# Patient Record
Sex: FEMALE | Race: WHITE
[De-identification: names, ages, dates, MRNs, and addresses within clinical notes are randomized per-mention and may not be internally consistent; named-entity substitution may affect disease eponyms.]

---

## 2018-01-29 ENCOUNTER — HOSPITAL ENCOUNTER (INPATIENT)
Dept: HOSPITAL 17 - PHED | Age: 83
LOS: 2 days | Discharge: HOME | DRG: 193 | End: 2018-01-31
Attending: HOSPITALIST | Admitting: HOSPITALIST
Payer: COMMERCIAL

## 2018-01-29 VITALS — HEART RATE: 107 BPM | DIASTOLIC BLOOD PRESSURE: 90 MMHG | SYSTOLIC BLOOD PRESSURE: 167 MMHG | OXYGEN SATURATION: 93 %

## 2018-01-29 VITALS
OXYGEN SATURATION: 93 % | SYSTOLIC BLOOD PRESSURE: 154 MMHG | DIASTOLIC BLOOD PRESSURE: 86 MMHG | RESPIRATION RATE: 18 BRPM | HEART RATE: 106 BPM

## 2018-01-29 VITALS — WEIGHT: 171.3 LBS | HEIGHT: 65 IN | BODY MASS INDEX: 28.54 KG/M2

## 2018-01-29 VITALS
OXYGEN SATURATION: 93 % | RESPIRATION RATE: 22 BRPM | DIASTOLIC BLOOD PRESSURE: 81 MMHG | SYSTOLIC BLOOD PRESSURE: 143 MMHG | HEART RATE: 122 BPM | TEMPERATURE: 97.6 F

## 2018-01-29 VITALS
HEART RATE: 114 BPM | OXYGEN SATURATION: 88 % | SYSTOLIC BLOOD PRESSURE: 194 MMHG | DIASTOLIC BLOOD PRESSURE: 84 MMHG | TEMPERATURE: 97.8 F | RESPIRATION RATE: 18 BRPM

## 2018-01-29 VITALS
DIASTOLIC BLOOD PRESSURE: 92 MMHG | SYSTOLIC BLOOD PRESSURE: 156 MMHG | RESPIRATION RATE: 20 BRPM | TEMPERATURE: 97.9 F | HEART RATE: 118 BPM | OXYGEN SATURATION: 90 %

## 2018-01-29 VITALS — OXYGEN SATURATION: 95 %

## 2018-01-29 VITALS
HEART RATE: 103 BPM | RESPIRATION RATE: 20 BRPM | SYSTOLIC BLOOD PRESSURE: 169 MMHG | DIASTOLIC BLOOD PRESSURE: 99 MMHG | OXYGEN SATURATION: 94 %

## 2018-01-29 VITALS
SYSTOLIC BLOOD PRESSURE: 157 MMHG | RESPIRATION RATE: 20 BRPM | DIASTOLIC BLOOD PRESSURE: 74 MMHG | OXYGEN SATURATION: 94 % | HEART RATE: 118 BPM

## 2018-01-29 VITALS
SYSTOLIC BLOOD PRESSURE: 167 MMHG | HEART RATE: 105 BPM | DIASTOLIC BLOOD PRESSURE: 78 MMHG | OXYGEN SATURATION: 93 % | RESPIRATION RATE: 20 BRPM

## 2018-01-29 VITALS
DIASTOLIC BLOOD PRESSURE: 80 MMHG | HEART RATE: 125 BPM | TEMPERATURE: 98 F | RESPIRATION RATE: 18 BRPM | SYSTOLIC BLOOD PRESSURE: 174 MMHG | OXYGEN SATURATION: 92 %

## 2018-01-29 VITALS — OXYGEN SATURATION: 93 %

## 2018-01-29 VITALS — DIASTOLIC BLOOD PRESSURE: 64 MMHG | SYSTOLIC BLOOD PRESSURE: 139 MMHG

## 2018-01-29 VITALS — OXYGEN SATURATION: 88 %

## 2018-01-29 VITALS — HEART RATE: 127 BPM

## 2018-01-29 VITALS — OXYGEN SATURATION: 86 %

## 2018-01-29 DIAGNOSIS — J98.01: ICD-10-CM

## 2018-01-29 DIAGNOSIS — J44.0: ICD-10-CM

## 2018-01-29 DIAGNOSIS — R00.0: ICD-10-CM

## 2018-01-29 DIAGNOSIS — J44.1: ICD-10-CM

## 2018-01-29 DIAGNOSIS — J96.91: ICD-10-CM

## 2018-01-29 DIAGNOSIS — Z85.3: ICD-10-CM

## 2018-01-29 DIAGNOSIS — E86.0: ICD-10-CM

## 2018-01-29 DIAGNOSIS — Z87.891: ICD-10-CM

## 2018-01-29 DIAGNOSIS — J18.9: Primary | ICD-10-CM

## 2018-01-29 LAB
BASOPHILS # BLD AUTO: 0 TH/MM3 (ref 0–0.2)
BASOPHILS NFR BLD: 0.5 % (ref 0–2)
BUN SERPL-MCNC: 9 MG/DL (ref 7–18)
CALCIUM SERPL-MCNC: 8.7 MG/DL (ref 8.5–10.1)
CHLORIDE SERPL-SCNC: 98 MEQ/L (ref 98–107)
CREAT SERPL-MCNC: 0.54 MG/DL (ref 0.5–1)
EOSINOPHIL # BLD: 0 TH/MM3 (ref 0–0.4)
EOSINOPHIL NFR BLD: 0.4 % (ref 0–4)
ERYTHROCYTE [DISTWIDTH] IN BLOOD BY AUTOMATED COUNT: 12.9 % (ref 11.6–17.2)
GFR SERPLBLD BASED ON 1.73 SQ M-ARVRAT: 109 ML/MIN (ref 89–?)
GLUCOSE SERPL-MCNC: 118 MG/DL (ref 74–106)
HCO3 BLD-SCNC: 31.1 MEQ/L (ref 21–32)
HCT VFR BLD CALC: 45.9 % (ref 35–46)
HGB BLD-MCNC: 14.8 GM/DL (ref 11.6–15.3)
LYMPHOCYTES # BLD AUTO: 0.9 TH/MM3 (ref 1–4.8)
LYMPHOCYTES NFR BLD AUTO: 13.8 % (ref 9–44)
MCH RBC QN AUTO: 28.1 PG (ref 27–34)
MCHC RBC AUTO-ENTMCNC: 32.2 % (ref 32–36)
MCV RBC AUTO: 87.3 FL (ref 80–100)
MONOCYTE #: 0.7 TH/MM3 (ref 0–0.9)
MONOCYTES NFR BLD: 11.1 % (ref 0–8)
NEUTROPHILS # BLD AUTO: 4.8 TH/MM3 (ref 1.8–7.7)
NEUTROPHILS NFR BLD AUTO: 74.2 % (ref 16–70)
PLATELET # BLD: 179 TH/MM3 (ref 150–450)
PMV BLD AUTO: 9.1 FL (ref 7–11)
RBC # BLD AUTO: 5.26 MIL/MM3 (ref 4–5.3)
SODIUM SERPL-SCNC: 136 MEQ/L (ref 136–145)
TROPONIN I SERPL-MCNC: (no result) NG/ML (ref 0.02–0.05)
WBC # BLD AUTO: 6.4 TH/MM3 (ref 4–11)

## 2018-01-29 PROCEDURE — 78582 LUNG VENTILAT&PERFUS IMAGING: CPT

## 2018-01-29 PROCEDURE — 96374 THER/PROPH/DIAG INJ IV PUSH: CPT

## 2018-01-29 PROCEDURE — 94618 PULMONARY STRESS TESTING: CPT

## 2018-01-29 PROCEDURE — 84145 PROCALCITONIN (PCT): CPT

## 2018-01-29 PROCEDURE — 87040 BLOOD CULTURE FOR BACTERIA: CPT

## 2018-01-29 PROCEDURE — 80048 BASIC METABOLIC PNL TOTAL CA: CPT

## 2018-01-29 PROCEDURE — A9567 TECHNETIUM TC-99M AEROSOL: HCPCS

## 2018-01-29 PROCEDURE — A9540 TC99M MAA: HCPCS

## 2018-01-29 PROCEDURE — 83880 ASSAY OF NATRIURETIC PEPTIDE: CPT

## 2018-01-29 PROCEDURE — 93005 ELECTROCARDIOGRAM TRACING: CPT

## 2018-01-29 PROCEDURE — 85379 FIBRIN DEGRADATION QUANT: CPT

## 2018-01-29 PROCEDURE — 84484 ASSAY OF TROPONIN QUANT: CPT

## 2018-01-29 PROCEDURE — 71045 X-RAY EXAM CHEST 1 VIEW: CPT

## 2018-01-29 PROCEDURE — 85025 COMPLETE CBC W/AUTO DIFF WBC: CPT

## 2018-01-29 PROCEDURE — 94664 DEMO&/EVAL PT USE INHALER: CPT

## 2018-01-29 PROCEDURE — 94640 AIRWAY INHALATION TREATMENT: CPT

## 2018-01-29 RX ADMIN — HEPARIN SODIUM SCH UNITS: 10000 INJECTION, SOLUTION INTRAVENOUS; SUBCUTANEOUS at 20:11

## 2018-01-29 RX ADMIN — IPRATROPIUM BROMIDE AND ALBUTEROL SULFATE SCH AMPULE: .5; 3 SOLUTION RESPIRATORY (INHALATION) at 14:37

## 2018-01-29 RX ADMIN — IPRATROPIUM BROMIDE AND ALBUTEROL SULFATE SCH AMPULE: .5; 3 SOLUTION RESPIRATORY (INHALATION) at 19:25

## 2018-01-29 RX ADMIN — IPRATROPIUM BROMIDE AND ALBUTEROL SULFATE SCH AMPULE: .5; 3 SOLUTION RESPIRATORY (INHALATION) at 12:17

## 2018-01-29 RX ADMIN — PHENYTOIN SODIUM SCH MLS/HR: 50 INJECTION INTRAMUSCULAR; INTRAVENOUS at 18:07

## 2018-01-29 RX ADMIN — IPRATROPIUM BROMIDE AND ALBUTEROL SULFATE SCH AMPULE: .5; 3 SOLUTION RESPIRATORY (INHALATION) at 14:38

## 2018-01-29 RX ADMIN — STANDARDIZED SENNA CONCENTRATE AND DOCUSATE SODIUM SCH TAB: 8.6; 5 TABLET, FILM COATED ORAL at 20:10

## 2018-01-29 RX ADMIN — Medication PRN ML: at 12:33

## 2018-01-29 RX ADMIN — METHYLPREDNISOLONE SODIUM SUCCINATE SCH MG: 40 INJECTION, POWDER, FOR SOLUTION INTRAMUSCULAR; INTRAVENOUS at 18:06

## 2018-01-29 RX ADMIN — LEVOFLOXACIN SCH MLS/HR: 5 INJECTION, SOLUTION INTRAVENOUS at 18:06

## 2018-01-29 NOTE — HHI.HP
__________________________________________________





[facility]


Service


[facility]


Primary Care Physician


INGRID Walton Do, MD


Admission Diagnosis


Bronchitis with bronchospasm


Diagnoses:  


(1) Bronchospasm


Diagnosis:  Principal





Chief Complaint:  


Shortness of breath and productive cough


Travel History


International Travel<30 Days:  No


Contact w/Intl Traveler <30 Da:  No


Traveled to Known Affected Are:  No


History of Present Illness


This is a pleasant 82-year-old female with a known medical history of breast 

cancer and COPD with history of tobacco use presented to the ED with complaints 

of worsening shortness of breath and productive cough.  Patient states the 

symptoms have been occurring for two weeks now, has seen her primary care 

provider who prescribed her azithromycin.  Patient completed her antibiotic 

last week and states that she felt even worse.  Patient does admit to weakness, 

states that she's been lying in bed for several days.  Lives at home with her 

.  Denies any recent fever, chills, nausea, vomiting, diarrhea, chest 

pain, abdominal pain, dysuria.  States she hasn't ate or drank much for the 

past week due to poor appetite and weakness.





Review of Systems


Constitutional:  COMPLAINS OF: Chills, DENIES: Fever


Eyes:  DENIES: Blurred vision, Diplopia


Ears, nose, mouth, throat:  DENIES: Hearing loss


Respiratory:  COMPLAINS OF: Sputum production, Shortness of breath


Cardiovascular:  DENIES: Chest pain, Palpitations


Gastrointestinal:  DENIES: Abdominal pain, Black stools, Bloody stools, 

Constipation, Diarrhea, Nausea, Vomiting


Integumentary:  DENIES: Abnormal pigmentation


Hematologic/lymphatic:  DENIES: Bruising


Immunologic/allergic:  DENIES: Eczema


Neurologic:  DENIES: Abnormal gait


Psychiatric:  COMPLAINS OF: Anxiety


Except as stated in HPI:  all other systems reviewed are Neg





Past Family Social History


Past Medical History


History of tobacco abuse


History of COPD


History of breast cancer, lumpectomy with no radiation or chemotherapy.


Past Surgical History


Left lumpectomy for history of breast cancer


Reported Medications


Active


Reported


Citalopram (Citalopram Hydrobromide) 10 Mg Tab 10 Mg PO DAILY for insomnia


Allergies:  


Coded Allergies:  


     No Known Allergies (Verified  Allergy, Unknown, 18)


Active Ordered Medications





Current Medications








 Medications


  (Trade)  Dose


 Ordered  Sig/Catarino


 Route  Start Time


 Stop Time Status Last Admin


 


  (NS Flush)  2 ml  UNSCH  PRN


 IVF  18 12:15


    18 12:33


 


 


  (Tylenol)  650 mg  Q4H  PRN


 PO  18 17:00


     


 


 


  (Zofran Inj)  4 mg  Q6H  PRN


 IVP  18 17:00


     


 


 


  (Narcan Inj)  0.4 mg  UNSCH  PRN


 IV PUSH  18 17:00


     


 


 


  (Antonietta-Colace)  1 tab  BID


 PO  18 21:00


     


 


 


  (Milk Of


 Magnesia Liq)  30 ml  Q12H  PRN


 PO  18 17:00


     


 


 


  (Senokot)  17.2 mg  Q12H  PRN


 PO  18 17:00


     


 


 


  (Dulcolax Supp)  10 mg  DAILY  PRN


 RECTAL  18 17:00


     


 


 


  (CeleXA)  10 mg  DAILY


 PO  18 09:00


     


 


 


  (Pill Splitter)  1 ea  UNSCH  PRN


 OTHER  18 17:15


     


 








Family History


Maternal medical history significant for diabetes.  Father was healthy.


Social History


Patient denies any current tobacco use, states she quit smoking thirty years 

ago.  Denies any alcohol or illicit drug use.





Physical Exam


Vital Signs





Vital Signs








  Date Time  Temp Pulse Resp B/P (MAP) Pulse Ox O2 Delivery O2 Flow Rate FiO2


 


18 16:21 97.6 122 22 143/81 (101) 93 Nasal Cannula 2.00 


 


18 15:15  118 20 157/74 (101) 94 Nasal Cannula 2.00 


 


18 15:02      Nasal Cannula 2.00 


 


18 14:51  107  167/90 (115) 93 Nasal Cannula 2.00 


 


18 14:35     86 Room Air  


 


18 13:48  106 18 154/86 (108) 93 Nasal Cannula 2.00 


 


18 12:45  105 20 167/78 (107) 93 Nasal Cannula 2.00 


 


18 12:05      Nasal Cannula 2.00 


 


18 12:05     93 Room Air  


 


18 12:05     91 Room Air  


 


18 11:55 97.8 114 18 194/84 (120) 88   


 


18 11:40  103 20 169/99 (122) 94 Room Air  








Physical Exam


GENERAL: Well-nourished, well-developed patient in NAD.  On supplemental O2


SKIN: Warm and dry. No rash.


HEAD:  Normocephalic. Atraumatic.


EYES: Pupils equal and round. No scleral icterus. No injection or drainage. 


ENT: No nasal bleeding or discharge.  Mucous membranes pink and moist.


NECK: Supple. Trachea midline.  


CARDIOVASCULAR: Tachycardic.  S1, S2 noted. No murmur appreciated. 


RESPIRATORY: No accessory muscle use.  Diffuse expiratory wheezing. Breath 

sounds equal bilaterally.  


GASTROINTESTINAL: Abdomen soft, non-tender, nondistended. Normoactive bowel 

sounds x4.


MUSCULOSKELETAL: No obvious deformities. Extremities without clubbing, cyanosis

, or edema. 


NEUROLOGICAL: Awake and alert. No obvious cranial nerve deficits.  Motor 

grossly within normal limits. 5/5 muscle strength in bilateral upper and lower 

extremities.  Normal speech.


PSYCHIATRIC: Appropriate mood and affect; insight and judgment normal.


Laboratory





Laboratory Tests








Test


  18


12:25


 


White Blood Count 6.4 


 


Red Blood Count 5.26 


 


Hemoglobin 14.8 


 


Hematocrit 45.9 


 


Mean Corpuscular Volume 87.3 


 


Mean Corpuscular Hemoglobin 28.1 


 


Mean Corpuscular Hemoglobin


Concent 32.2 


 


 


Red Cell Distribution Width 12.9 


 


Platelet Count 179 


 


Mean Platelet Volume 9.1 


 


Neutrophils (%) (Auto) 74.2 


 


Lymphocytes (%) (Auto) 13.8 


 


Monocytes (%) (Auto) 11.1 


 


Eosinophils (%) (Auto) 0.4 


 


Basophils (%) (Auto) 0.5 


 


Neutrophils # (Auto) 4.8 


 


Lymphocytes # (Auto) 0.9 


 


Monocytes # (Auto) 0.7 


 


Eosinophils # (Auto) 0.0 


 


Basophils # (Auto) 0.0 


 


CBC Comment DIFF FINAL 


 


Differential Comment  


 


Blood Urea Nitrogen 9 


 


Creatinine 0.54 


 


Random Glucose 118 


 


Calcium Level 8.7 


 


Sodium Level 136 


 


Potassium Level 3.7 


 


Chloride Level 98 


 


Carbon Dioxide Level 31.1 


 


Anion Gap 7 


 


Estimat Glomerular Filtration


Rate 109 


 


 


Troponin I LESS THAN 0.02 


 


B-Type Natriuretic Peptide 93 














 Date/Time


Source Procedure


Growth Status


 


 


 18 12:25


Blood Peripheral Aerobic Blood Culture


Pending Received


 


 18 12:25


Blood Peripheral Anaerobic Blood Culture


Pending Received








Result Diagram:  


18 1225                                                                   

             18 1225





Imaging





Last Impressions








Chest X-Ray 18 1209 Signed





Impressions: 





 Service Date/Time:  2018 12:22 - CONCLUSION:  Mild 





 interstitial prominence at the bases.      Dakota Dorman MD 











Septic Shock Reassessment


Septic shock perfusion:  reassessment completed





Caprini VTE Risk Assessment


Caprini VTE Risk Assessment:  Mod/High Risk (score >= 2)


Caprini Risk Assessment Model











 Point Value = 1          Point Value = 2  Point Value = 3  Point Value = 5


 


Age 41-60


Minor surgery


BMI > 25 kg/m2


Swollen legs


Varicose veins


Pregnancy or postpartum


History of unexplained or recurrent


   spontaneous 


Oral contraceptives or hormone


   replacement


Sepsis (< 1 month)


Serious lung disease, including


   pneumonia (< 1 month)


Abnormal pulmonary function


Acute myocardial infarction


Congestive heart failure (< 1 month)


History of inflammatory bowel disease


Medical patient at bed rest Age 61-74


Arthroscopic surgery


Major open surgery (> 45 min)


Laparoscopic surgery (> 45 min)


Malignancy


Confined to bed (> 72 hours)


Immobilizing plaster cast


Central venous access Age >= 75


History of VTE


Family history of VTE


Factor V Leiden


Prothrombin 17336I


Lupus anticoagulant


Anticardiolipin antibodies


Elevated serum homocysteine


Heparin-induced thrombocytopenia


Other congenital or acquired


   thrombophilia Stroke (< 1 month)


Elective arthroplasty


Hip, pelvis, or leg fracture


Acute spinal cord injury (< 1 month)








Prophylaxis Regimen











   Total Risk


Factor Score Risk Level Prophylaxis Regimen


 


0-1      Low Early ambulation


 


2 Moderate Order ONE of the following:


*Sequential Compression Device (SCD)


*Heparin 5000 units SQ BID


 


3-4 Higher Order ONE of the following medications:


*Heparin 5000 units SQ TID


*Enoxaparin/Lovenox 40 mg SQ daily (WT < 150 kg, CrCl > 30 mL/min)


*Enoxaparin/Lovenox 30 mg SQ daily (WT < 150 kg, CrCl > 10-29 mL/min)


*Enoxaparin/Lovenox 30 mg SQ BID (WT < 150 kg, CrCl > 30 mL/min)


AND/OR


*Sequential Compression Device (SCD)


 


5 or more Highest Order ONE of the following medications:


*Heparin 5000 units SQ TID (Preferred with Epidurals)


*Enoxaparin/Lovenox 40 mg SQ daily (WT < 150 kg, CrCl > 30 mL/min)


*Enoxaparin/Lovenox 30 mg SQ daily (WT < 150 kg, CrCl > 10-29 mL/min)


*Enoxaparin/Lovenox 30 mg SQ BID (WT < 150 kg, CrCl > 30 mL/min)


AND


*Sequential Compression Device (SCD)











Assessment and Plan


Problem List:  


(1) Bronchospasm


ICD Code:  J98.01 - Acute bronchospasm


Status:  Acute


Plan:  Rule out PE vs pneumonia vs acute bronchitis


Patient has been placed on supplemental O2, 2 L nasal cannula.  Chest x-ray 

reviewed showing mild interstitial prominence at the bases.  Patient is 

afebrile.  White blood cell within normal limits.  CBC and BMP reviewed and 

essentially unremarkable.  Repeat labs in a.m.  BNP 93.  Treated outpatient 

with azithromycin.  Patient given one-time dose of Solu-Medrol 125 mg 1.


Will start on DuoNeb's as needed and scheduled.  Will continue IV steroids.  

Started on IV antibiotics.  Monitor for infection. Dr. Yin recommendations 

for procalcitonin level r/o infection. Follow.


Supplemental O2 as needed.


Blood cultures ordered and pending.  Follow growth.


Tachycardia present, most likely due to dehydration, will start on IV fluids.  

Will check a d-dimer to rule out possibility of PE.  Continue to follow.  

Continue cardiac telemetry.


PT evaluation, appreciate input and recommendations.








DVT prophylaxis: SCDs.  Heparin.








Physician Certification


2 Midnight Certification Type:  Admission for Inpatient Services


Order for Inpatient Services


The services are ordered in accordance with Medicare regulations or non-

Medicare payer requirements, as applicable.  In the case of services not 

specified as inpatient-only, they are appropriately provided as inpatient 

services in accordance with the 2-midnight benchmark.


Estimated LOS (days):  2


2 days is the estimated time the patient will need to remain in the hospital, 

assuming treatment plan goals are met and no additional complications.


Post-Hospital Plan:  Home











Nerissa De Souza 2018 17:43

## 2018-01-29 NOTE — PD
HPI


.


Shortness of breath


Chief Complaint:  Respiratory Symptoms


Time Seen by Provider:  12:02


Travel History


International Travel<30 days:  No


Contact w/Intl Traveler<30days:  No


Traveled to known affect area:  No





History of Present Illness


HPI


This patient presents with about a week long history of cough and shortness of 

breath.  She states that her primary care provider called her in a Z-Candelario taken 

and completed without relief.  She states that her weakness and shortness of 

breath has been so severe that she has been confined to bed for the past 

several days.  She has not noticed a fever.  She does report some sputum 

production.  She has not noted any modifying factors.





[facility]


Social History


Tobacco Use:  No





Allergies-Medications


(Allergen,Severity, Reaction):  


Coded Allergies:  


     No Known Allergies (Verified  Allergy, Unknown, 1/29/18)


Reported Meds & Prescriptions





Reported Meds & Active Scripts


Active


Reported


Citalopram (Citalopram Hydrobromide) 10 Mg Tab 10 Mg PO DAILY








Review of Systems


Except as stated in HPI:  all other systems reviewed are Neg


General / Constitutional:  No: Fever, Chills


Cardiovascular:  No: Chest Pain or Discomfort


Respiratory:  Positive: Cough, Shortness of Breath, Wheezing


Neurologic:  Positive: Weakness





Physical Exam


Narrative


GENERAL: Awake and alert and in no acute distress.


SKIN: Warm and dry.


HEAD: Normocephalic/atraumatic.


EYES: Pupils are equal.  Extraocular movements are intact.


NECK: Normal range of motion.


CARDIOVASCULAR: Regular rate and rhythm.  Sinus tachycardia at about 114.


RESPIRATORY: Lungs are tight with diffuse inspiratory and expiratory wheezing.


MUSCULOSKELETAL: Atraumatic.  There is no peripheral edema.


NEUROLOGICAL: Nonfocal.


PSYCHIATRIC: Appropriate mood and affect.





Data


Data


Last Documented VS





Vital Signs








  Date Time  Temp Pulse Resp B/P (MAP) Pulse Ox O2 Delivery O2 Flow Rate FiO2


 


1/29/18 15:15  118 20 157/74 (101) 94 Nasal Cannula 2.00 


 


1/29/18 11:55 97.8       








Orders





 Orders


Complete Blood Count With Diff (1/29/18 12:09)


Basic Metabolic Panel (Bmp) (1/29/18 12:09)


B-Type Natriuretic Peptide (1/29/18 12:09)


Troponin I (1/29/18 12:09)


Blood Culture (1/29/18 12:09)


Iv Access Insert/Monitor (1/29/18 12:09)


Electrocardiogram (1/29/18 12:09)


Ecg Monitoring (1/29/18 12:09)


Oximetry (1/29/18 12:09)


Oxygen Administration (1/29/18 12:09)


Chest, Single Ap (1/29/18 12:09)


Sodium Chloride 0.9% Flush (Ns Flush) (1/29/18 12:15)


Methylprednisolone So Succ Inj (Solumedr (1/29/18 12:15)


Albuterol-Ipratropium Neb (Duoneb Neb) (1/29/18 12:15)


Albuterol-Ipratropium Neb (Duoneb Neb) (1/29/18 14:30)


Resp Mdi/Instruction (1/29/18 14:18)


Admit To Inpatient (1/29/18 )


Vital Signs (Adult) ESTEBAN.Q4H (1/29/18 16:10)


Activity Oob With Assistance (1/29/18 16:10)


Cardiac Monitor / Telemetry ESTEBAN.Q8H (1/29/18 16:10)


Inpatient Certification (1/29/18 )


Admit Order (Ed Use Only) (1/29/18 )


Vital Signs (Adult) Q4H (1/29/18 16:10)


Diet Heart Healthy (1/29/18 Dinner)


Activity Oob With Assistance (1/29/18 16:10)


Notify Dr: Other (1/29/18 16:10)





Labs





Laboratory Tests








Test


  1/29/18


12:25


 


White Blood Count 6.4 TH/MM3 


 


Red Blood Count 5.26 MIL/MM3 


 


Hemoglobin 14.8 GM/DL 


 


Hematocrit 45.9 % 


 


Mean Corpuscular Volume 87.3 FL 


 


Mean Corpuscular Hemoglobin 28.1 PG 


 


Mean Corpuscular Hemoglobin


Concent 32.2 % 


 


 


Red Cell Distribution Width 12.9 % 


 


Platelet Count 179 TH/MM3 


 


Mean Platelet Volume 9.1 FL 


 


Neutrophils (%) (Auto) 74.2 % 


 


Lymphocytes (%) (Auto) 13.8 % 


 


Monocytes (%) (Auto) 11.1 % 


 


Eosinophils (%) (Auto) 0.4 % 


 


Basophils (%) (Auto) 0.5 % 


 


Neutrophils # (Auto) 4.8 TH/MM3 


 


Lymphocytes # (Auto) 0.9 TH/MM3 


 


Monocytes # (Auto) 0.7 TH/MM3 


 


Eosinophils # (Auto) 0.0 TH/MM3 


 


Basophils # (Auto) 0.0 TH/MM3 


 


CBC Comment DIFF FINAL 


 


Differential Comment  


 


Blood Urea Nitrogen 9 MG/DL 


 


Creatinine 0.54 MG/DL 


 


Random Glucose 118 MG/DL 


 


Calcium Level 8.7 MG/DL 


 


Sodium Level 136 MEQ/L 


 


Potassium Level 3.7 MEQ/L 


 


Chloride Level 98 MEQ/L 


 


Carbon Dioxide Level 31.1 MEQ/L 


 


Anion Gap 7 MEQ/L 


 


Estimat Glomerular Filtration


Rate 109 ML/MIN 


 


 


Troponin I


  LESS THAN 0.02


NG/ML


 


B-Type Natriuretic Peptide 93 PG/ML 











[facility]


Medical Decision Making


Medical Screen Exam Complete:  Yes


Emergency Medical Condition:  Yes


Medical Record Reviewed:  Yes (this patient's only visits in our system have 

been for mammograms.)


Interpretation(s)


EKG shows a sinus rhythm with a rate of 101.  No ST segment elevation or 

depression


Differential Diagnosis


Differential diagnosis of dyspnea includes but is not limited to congestive 

heart failure, pneumonia, wheezing, pneumothorax, pulmonary embolism


Narrative Course


This patient presents with a weeklong history of dyspnea.  She is wheezing on 

exam.  She will be treated with Solu-Medrol and stacked nebs.  In the meantime, 

she will be evaluated for possible pneumonia for possible CHF.








Last Impressions








Chest X-Ray 1/29/18 1209 Signed





Impressions: 





 Service Date/Time:  Monday, January 29, 2018 12:22 - CONCLUSION:  Mild 





 interstitial prominence at the bases.      Dakota Dorman MD 








CBC & BMP Diagram


1/29/18 12:25








Calcium Level 8.7





trop < 0.02





BNP 93





Patient was reexamined.  Her sats are 92% on 2 L of oxygen.  I turned off the 

oxygen and her sats held at about 92%.  Her lungs are still tight but air 

movement is improved.  Wheezing is louder.





I have offered admission.  The patient would prefer to be discharged to home.  

Therefore, I have ordered 3 more nebs.  I will reassess her following the nebs.





The patient's sats dropped shortly after her oxygen was discontinued.  She was 

placed back on oxygen.  She was reexamined following the second set of stacked 

nebs.  Her air movement has improved but she is still retracting and is 

wheezing.  Her oxygen saturation on 2 L of oxygen remained 92%.  At this point, 

she is amenable to admission.





Diagnosis





 Primary Impression:  


 Bronchospasm





Admitting Information


Admitting Physician Requests:  Admit


Condition:  Stable











Tara Brothers MD Jan 29, 2018 12:16

## 2018-01-29 NOTE — RADRPT
EXAM DATE/TIME:  01/29/2018 12:22 

 

[facility] COMPARISON:     

No previous studies available for comparison.

 

                     

INDICATIONS :     

Short of breath and cough

                     

 

MEDICAL HISTORY :     

None.          

 

SURGICAL HISTORY :     

None.   

 

ENCOUNTER:     

Initial                                        

 

ACUITY:     

1 week      

 

PAIN SCORE:     

0/10

 

LOCATION:     

Bilateral chest 

 

FINDINGS:     

The heart size is normal. There is mild prominence of the interstitium in the bases. Lungs appear oth
erwise clear. No effusion is seen.

 

CONCLUSION:     

Mild interstitial prominence at the bases. 

 

 

 

 Dakota Dorman MD on January 29, 2018 at 12:57           

Board Certified Radiologist.

 This report was verified electronically.

## 2018-01-30 VITALS
SYSTOLIC BLOOD PRESSURE: 146 MMHG | HEART RATE: 99 BPM | RESPIRATION RATE: 20 BRPM | DIASTOLIC BLOOD PRESSURE: 87 MMHG | TEMPERATURE: 97.2 F | OXYGEN SATURATION: 93 %

## 2018-01-30 VITALS
TEMPERATURE: 96 F | HEART RATE: 104 BPM | OXYGEN SATURATION: 92 % | RESPIRATION RATE: 20 BRPM | DIASTOLIC BLOOD PRESSURE: 83 MMHG | SYSTOLIC BLOOD PRESSURE: 160 MMHG

## 2018-01-30 VITALS
HEART RATE: 96 BPM | SYSTOLIC BLOOD PRESSURE: 166 MMHG | TEMPERATURE: 97.4 F | RESPIRATION RATE: 20 BRPM | OXYGEN SATURATION: 94 % | DIASTOLIC BLOOD PRESSURE: 89 MMHG

## 2018-01-30 VITALS
OXYGEN SATURATION: 94 % | SYSTOLIC BLOOD PRESSURE: 159 MMHG | HEART RATE: 107 BPM | TEMPERATURE: 96 F | DIASTOLIC BLOOD PRESSURE: 82 MMHG | RESPIRATION RATE: 20 BRPM

## 2018-01-30 VITALS
RESPIRATION RATE: 18 BRPM | OXYGEN SATURATION: 95 % | DIASTOLIC BLOOD PRESSURE: 70 MMHG | SYSTOLIC BLOOD PRESSURE: 128 MMHG | TEMPERATURE: 96.7 F | HEART RATE: 103 BPM

## 2018-01-30 VITALS
DIASTOLIC BLOOD PRESSURE: 83 MMHG | RESPIRATION RATE: 20 BRPM | HEART RATE: 40 BPM | SYSTOLIC BLOOD PRESSURE: 157 MMHG | TEMPERATURE: 96 F | OXYGEN SATURATION: 92 %

## 2018-01-30 VITALS — OXYGEN SATURATION: 94 %

## 2018-01-30 VITALS
TEMPERATURE: 99.3 F | SYSTOLIC BLOOD PRESSURE: 175 MMHG | DIASTOLIC BLOOD PRESSURE: 83 MMHG | OXYGEN SATURATION: 90 % | RESPIRATION RATE: 18 BRPM | HEART RATE: 101 BPM

## 2018-01-30 VITALS — OXYGEN SATURATION: 92 %

## 2018-01-30 LAB
BASOPHILS # BLD AUTO: 0 TH/MM3 (ref 0–0.2)
BASOPHILS NFR BLD: 0 % (ref 0–2)
BUN SERPL-MCNC: 10 MG/DL (ref 7–18)
CALCIUM SERPL-MCNC: 8.2 MG/DL (ref 8.5–10.1)
CHLORIDE SERPL-SCNC: 101 MEQ/L (ref 98–107)
CREAT SERPL-MCNC: 0.44 MG/DL (ref 0.5–1)
EOSINOPHIL # BLD: 0 TH/MM3 (ref 0–0.4)
EOSINOPHIL NFR BLD: 0.5 % (ref 0–4)
ERYTHROCYTE [DISTWIDTH] IN BLOOD BY AUTOMATED COUNT: 12.6 % (ref 11.6–17.2)
GFR SERPLBLD BASED ON 1.73 SQ M-ARVRAT: 138 ML/MIN (ref 89–?)
GLUCOSE SERPL-MCNC: 147 MG/DL (ref 74–106)
HCO3 BLD-SCNC: 31.2 MEQ/L (ref 21–32)
HCT VFR BLD CALC: 41.2 % (ref 35–46)
HGB BLD-MCNC: 13.4 GM/DL (ref 11.6–15.3)
LYMPHOCYTES # BLD AUTO: 0.5 TH/MM3 (ref 1–4.8)
LYMPHOCYTES NFR BLD AUTO: 12.5 % (ref 9–44)
MCH RBC QN AUTO: 28.2 PG (ref 27–34)
MCHC RBC AUTO-ENTMCNC: 32.7 % (ref 32–36)
MCV RBC AUTO: 86.2 FL (ref 80–100)
MONOCYTE #: 0.1 TH/MM3 (ref 0–0.9)
MONOCYTES NFR BLD: 3.3 % (ref 0–8)
NEUTROPHILS # BLD AUTO: 3.8 TH/MM3 (ref 1.8–7.7)
NEUTROPHILS NFR BLD AUTO: 83.7 % (ref 16–70)
PLATELET # BLD: 171 TH/MM3 (ref 150–450)
PMV BLD AUTO: 9.3 FL (ref 7–11)
RBC # BLD AUTO: 4.78 MIL/MM3 (ref 4–5.3)
SODIUM SERPL-SCNC: 139 MEQ/L (ref 136–145)
WBC # BLD AUTO: 4.4 TH/MM3 (ref 4–11)

## 2018-01-30 RX ADMIN — METHYLPREDNISOLONE SODIUM SUCCINATE SCH MG: 40 INJECTION, POWDER, FOR SOLUTION INTRAMUSCULAR; INTRAVENOUS at 05:46

## 2018-01-30 RX ADMIN — LEVOFLOXACIN SCH MLS/HR: 5 INJECTION, SOLUTION INTRAVENOUS at 18:36

## 2018-01-30 RX ADMIN — PHENYTOIN SODIUM SCH MLS/HR: 50 INJECTION INTRAMUSCULAR; INTRAVENOUS at 05:48

## 2018-01-30 RX ADMIN — METHYLPREDNISOLONE SODIUM SUCCINATE SCH MG: 40 INJECTION, POWDER, FOR SOLUTION INTRAMUSCULAR; INTRAVENOUS at 12:55

## 2018-01-30 RX ADMIN — METHYLPREDNISOLONE SODIUM SUCCINATE SCH MG: 40 INJECTION, POWDER, FOR SOLUTION INTRAMUSCULAR; INTRAVENOUS at 00:14

## 2018-01-30 RX ADMIN — HEPARIN SODIUM SCH UNITS: 10000 INJECTION, SOLUTION INTRAVENOUS; SUBCUTANEOUS at 09:19

## 2018-01-30 RX ADMIN — IPRATROPIUM BROMIDE AND ALBUTEROL SULFATE SCH AMPULE: .5; 3 SOLUTION RESPIRATORY (INHALATION) at 14:55

## 2018-01-30 RX ADMIN — STANDARDIZED SENNA CONCENTRATE AND DOCUSATE SODIUM SCH TAB: 8.6; 5 TABLET, FILM COATED ORAL at 21:56

## 2018-01-30 RX ADMIN — METHYLPREDNISOLONE SODIUM SUCCINATE SCH MG: 40 INJECTION, POWDER, FOR SOLUTION INTRAMUSCULAR; INTRAVENOUS at 18:36

## 2018-01-30 RX ADMIN — STANDARDIZED SENNA CONCENTRATE AND DOCUSATE SODIUM SCH TAB: 8.6; 5 TABLET, FILM COATED ORAL at 09:19

## 2018-01-30 RX ADMIN — HEPARIN SODIUM SCH UNITS: 10000 INJECTION, SOLUTION INTRAVENOUS; SUBCUTANEOUS at 21:56

## 2018-01-30 RX ADMIN — CITALOPRAM SCH MG: 20 TABLET, FILM COATED ORAL at 09:19

## 2018-01-30 RX ADMIN — IPRATROPIUM BROMIDE AND ALBUTEROL SULFATE SCH AMPULE: .5; 3 SOLUTION RESPIRATORY (INHALATION) at 20:06

## 2018-01-30 RX ADMIN — IPRATROPIUM BROMIDE AND ALBUTEROL SULFATE SCH AMPULE: .5; 3 SOLUTION RESPIRATORY (INHALATION) at 07:49

## 2018-01-30 NOTE — HHI.PR
Subjective


Remarks


Patient seen today in follow for COPD with acute exacerbation for respiratory 

failure secondary hypoxemia


Feels a bit better today.





Objective


Vitals





Vital Signs








  Date Time  Temp Pulse Resp B/P (MAP) Pulse Ox O2 Delivery O2 Flow Rate FiO2


 


1/30/18 08:00 97.4 96 20 166/89 (114) 94   


 


1/30/18 07:30     92 Nasal Cannula 4.00 


 


1/30/18 04:00 99.3 101 18 175/83 (113) 90   


 


1/30/18 00:00 97.1 40 18 157/88 (111) 94   


 


1/29/18 23:00  127      


 


1/29/18 22:00 97.9 118 20 156/92 (113) 90   


 


1/29/18 20:00     92 Nasal Cannula 3.00 


 


1/29/18 19:25     88 Nasal Cannula 3.00 


 


1/29/18 18:56 98.0 125 18 174/80 (111) 92   


 


1/29/18 18:48  125 22 139/64 (89) 94   


 


1/29/18 16:21 97.6 122 22 143/81 (101) 93 Nasal Cannula 2.00 


 


1/29/18 15:15  118 20 157/74 (101) 94 Nasal Cannula 2.00 


 


1/29/18 15:02      Nasal Cannula 2.00 


 


1/29/18 15:00     95 Nasal Cannula 2.00 


 


1/29/18 14:51  107  167/90 (115) 93 Nasal Cannula 2.00 


 


1/29/18 14:35     86 Room Air  


 


1/29/18 13:48  106 18 154/86 (108) 93 Nasal Cannula 2.00 


 


1/29/18 12:45  105 20 167/78 (107) 93 Nasal Cannula 2.00 














I/O      


 


 1/29/18 1/29/18 1/29/18 1/30/18 1/30/18 1/30/18





 07:00 15:00 23:00 07:00 15:00 23:00


 


Intake Total   1906 ml 755 ml  


 


Output Total     300 ml 


 


Balance   1906 ml 755 ml -300 ml 


 


      


 


Intake Oral    240 ml  


 


IV Total   1906 ml 515 ml  


 


Output Urine Total     300 ml 


 


# Voids    1  


 


# Bowel Movements    0  








Result Diagram:  


1/30/18 0445 1/30/18 0445





Objective Remarks


GENERAL: This is a well-nourished, well-developed patient, in no apparent 

distress.


CARDIOVASCULAR: Regular rate and rhythm without murmurs, gallops, or rubs. 


RESPIRATORY: Scattered wheezes without rhonchi


GASTROINTESTINAL: Abdomen soft, non-tender, nondistended. Normal active bowel 

sounds


MUSCULOSKELETAL: Extremities without clubbing, cyanosis, or edema.


NEURO:  Alert & Oriented x4 to person, place, time, situation.  Moves all ext x4





A/P


Problem List:  


(1) COPD (chronic obstructive pulmonary disease)


ICD Code:  J44.9 - Chronic obstructive pulmonary disease, unspecified


Plan:  Likely with pneumonia and bronchitis continue IV Levaquin, wean IV 

steroids


Pulmonary toilet with bronchodilators


We'll check walk test





Discharge Planning


Likely discharge in a.m. on oral antibiotics and oral steroids











Hazel Donaldson MD Jan 30, 2018 12:23

## 2018-01-30 NOTE — RADRPT
EXAM DATE/TIME:  01/30/2018 12:16 

 

[facility] COMPARISON:     

CHEST SINGLE AP, January 29, 2018, 12:22.

 

INDICATIONS :      Dyspnea.

                       

DOSE:      8.5 mCi Tc99m MAA IV 

                                           1.7 mCi Tc99m DTPA aerosol 

                       

                       

MEDICAL HISTORY :     Carcinoma, breast. Chronic obstructive pulmonary disease.  

SURGICAL HISTORY :          Lumpectomy.

ENCOUNTER:     Initial

ACUITY:     1 day

PAIN SCALE:     0/10

LOCATION:       chest 

 

TECHNIQUE:     Following five minutes of tidal breathing of DTPA aerosol, planar images of the lungs 
were performed in eight projections.  The patient was then injected with MAA, and eight-view perfusio
n scan was performed.  

 

FINDINGS:      

There is a inhomogeneous pattern of aerosol delivery to the periphery of both lungs.  No focal ventil
atory defects are seen.

 

The perfusion lung scan demonstrates a homogenous pattern of uptake in both lungs.  No segmental or s
ubsegmental defects are seen. 

 

CONCLUSION:     Low probability of pulmonary embolization 

 Eros Villavicencio MD on January 30, 2018 at 13:03           

Board Certified Radiologist.

 This report was verified electronically.

## 2018-01-31 VITALS
TEMPERATURE: 97.6 F | HEART RATE: 97 BPM | OXYGEN SATURATION: 92 % | RESPIRATION RATE: 20 BRPM | SYSTOLIC BLOOD PRESSURE: 168 MMHG | DIASTOLIC BLOOD PRESSURE: 78 MMHG

## 2018-01-31 VITALS — OXYGEN SATURATION: 94 %

## 2018-01-31 VITALS
SYSTOLIC BLOOD PRESSURE: 170 MMHG | DIASTOLIC BLOOD PRESSURE: 83 MMHG | TEMPERATURE: 97 F | HEART RATE: 88 BPM | RESPIRATION RATE: 20 BRPM | OXYGEN SATURATION: 92 %

## 2018-01-31 VITALS
TEMPERATURE: 97.5 F | HEART RATE: 93 BPM | DIASTOLIC BLOOD PRESSURE: 82 MMHG | SYSTOLIC BLOOD PRESSURE: 170 MMHG | OXYGEN SATURATION: 95 % | RESPIRATION RATE: 20 BRPM

## 2018-01-31 VITALS — DIASTOLIC BLOOD PRESSURE: 101 MMHG | SYSTOLIC BLOOD PRESSURE: 167 MMHG | HEART RATE: 93 BPM

## 2018-01-31 RX ADMIN — METHYLPREDNISOLONE SODIUM SUCCINATE SCH MG: 40 INJECTION, POWDER, FOR SOLUTION INTRAMUSCULAR; INTRAVENOUS at 00:20

## 2018-01-31 RX ADMIN — IPRATROPIUM BROMIDE AND ALBUTEROL SULFATE SCH AMPULE: .5; 3 SOLUTION RESPIRATORY (INHALATION) at 07:37

## 2018-01-31 RX ADMIN — CITALOPRAM SCH MG: 20 TABLET, FILM COATED ORAL at 09:33

## 2018-01-31 RX ADMIN — HEPARIN SODIUM SCH UNITS: 10000 INJECTION, SOLUTION INTRAVENOUS; SUBCUTANEOUS at 09:34

## 2018-01-31 RX ADMIN — Medication PRN ML: at 06:12

## 2018-01-31 RX ADMIN — IPRATROPIUM BROMIDE AND ALBUTEROL SULFATE SCH AMPULE: .5; 3 SOLUTION RESPIRATORY (INHALATION) at 14:13

## 2018-01-31 RX ADMIN — STANDARDIZED SENNA CONCENTRATE AND DOCUSATE SODIUM SCH TAB: 8.6; 5 TABLET, FILM COATED ORAL at 09:34

## 2018-01-31 RX ADMIN — METHYLPREDNISOLONE SODIUM SUCCINATE SCH MG: 40 INJECTION, POWDER, FOR SOLUTION INTRAMUSCULAR; INTRAVENOUS at 06:11

## 2018-01-31 RX ADMIN — Medication PRN ML: at 00:20

## 2018-01-31 RX ADMIN — METHYLPREDNISOLONE SODIUM SUCCINATE SCH MG: 40 INJECTION, POWDER, FOR SOLUTION INTRAMUSCULAR; INTRAVENOUS at 13:56

## 2018-01-31 NOTE — HHI.FF
Face to Face Verification


Diagnosis:  


(1) Physical deconditioning


(2) COPD (chronic obstructive pulmonary disease)


Physical Therapy


Order:  Evaluate and Treat, Improve ambulation





Home Health Nursing








Order: Medical education





 Oxygen administration education

















I have seen patient Marla Gunter on 1/31/18. My clinical findings support the 

need for the requested home health care services because:








 Patient has SOB














I certify that my clinical findings support that this patient is homebound 

because:








 Hx COPD- exertion dyspnea/weakness

















Hazel Donaldson MD Jan 31, 2018 11:45

## 2018-01-31 NOTE — HHI.DS
__________________________________________________





Discharge Summary


Admission Date


Jan 29, 2018 at 16:11


Discharge Date:  Jan 31, 2018


Admitting Diagnosis


Bronchitis with bronchospasm





(1) COPD (chronic obstructive pulmonary disease)


ICD Code:  J44.9 - Chronic obstructive pulmonary disease, unspecified


Procedures


None


Brief History - From Admission


This is a pleasant 82-year-old female with a known medical history of breast 

cancer and COPD with history of tobacco use presented to the ED with complaints 

of worsening shortness of breath and productive cough.  Patient states the 

symptoms have been occurring for two weeks now, has seen her primary care 

provider who prescribed her azithromycin.  Patient completed her antibiotic 

last week and states that she felt even worse.  Patient does admit to weakness, 

states that she's been lying in bed for several days.  Lives at home with her 

.  Denies any recent fever, chills, nausea, vomiting, diarrhea, chest 

pain, abdominal pain, dysuria.  States she hasn't ate or drank much for the 

past week due to poor appetite and weakness.


CBC/BMP:  


1/30/18 0445                                                                   

             1/30/18 0445





Significant Findings





Laboratory Tests








Test


  1/29/18


12:25 1/29/18


19:35 1/29/18


21:47 1/30/18


04:45


 


Neutrophils (%) (Auto)


  74.2 %


(16.0-70.0) 


  


  83.7 %


(16.0-70.0)


 


Monocytes (%) (Auto)


  11.1 %


(0.0-8.0) 


  


  


 


 


Lymphocytes # (Auto)


  0.9 TH/MM3


(1.0-4.8) 


  


  0.5 TH/MM3


(1.0-4.8)


 


Random Glucose


  118 MG/DL


() 


  


  147 MG/DL


()


 


Troponin I


  LESS THAN 0.02


NG/ML 


  


  


 


 


D-Dimer Quantitative (PE/DVT)


  


  0.65 MG/L FEU


(0.00-0.50) 


  


 


 


Creatinine


  


  


  


  0.44 MG/DL


(0.50-1.00)


 


Calcium Level


  


  


  


  8.2 MG/DL


(8.5-10.1)








PE at Discharge


GENERAL: This is a well-nourished, well-developed patient, in no apparent 

distress.


CARDIOVASCULAR: Regular rate and rhythm without murmurs, gallops, or rubs. 


RESPIRATORY: Scattered wheezes without rhonchi


GASTROINTESTINAL: Abdomen soft, non-tender, nondistended. Normal active bowel 

sounds


MUSCULOSKELETAL: Extremities without clubbing, cyanosis, or edema.


NEURO:  Alert & Oriented x4 to person, place, time, situation.  Moves all ext x4


Pt update on day of discharge


Seen today in follow-up for COPD exacerbation, doing better.  No new 

complaints.  Discharge plans discussed with patient


Hospital Course


This patient is an 82-year-old female was evaluated and treated for shortness 

of breath secondary to COPD exacerbation.  She did require oxygen and this was 

arranged.  She was given steroids IV as well as nebulized bronchodilators.  She 

was also given IV antibiotics.  She improved dramatically.  Patient education 

was provided and she was discharged home


Pt Condition on Discharge:  Good


Discharge Disposition:  Discharge Home


Discharge Time:  <= 30 minutes


Discharge Instructions


DIET: Follow Instructions for:  As Tolerated, No Restrictions


Activities you can perform:  Regular-No Restrictions


Follow up Referrals:  


PCP Follow-up





New Medications:  


Levofloxacin (Levofloxacin) 500 Mg Tablet


500 MG PO DAILY for Infection, #7 TAB 0 Refills





Nebulizer (Nebulizer) 1 Mis Mis


EA .XX AS DIRECTED for Breathing Treatment, #1 1 Refill





Oxygen (O2) (Oxygen (O2))  Device


LITER ADRIAN.CANULA CONTINUOUS for Prevent Hypoxemia, #4


Oxygen Concentrator Portable Gaseous


 4 L/min via Nasal Canula Continuous


 For 99 months


Prednisone (Prednisone) 20 Mg Tab


20 MG PO AS DIRECTED for Inflammation, #18 TAB 0 Refills


40 MG twice a day x 3 days, then 20 MG daily x 3 days, then 10 MG


 daily x 3 days


Walker with Front Wheels (Walker with Front Wheels) 1 Mis Mis


EA .XX AS DIRECTED, #1 0 Refills





[Albuterol-Ipratropium Neb] () 1 AMPULE NEBU


1 AMPULE NEB Q6HR WHILE AWAKE NEB PRN for DYSPNEA, #60 AMPULE





 


Continued Medications:  


Citalopram (Citalopram) 10 Mg Tab


10 MG PO DAILY for Control Depression, #30 TAB 0 Refills

















Hazel Donaldson MD Jan 31, 2018 11:52

## 2018-01-31 NOTE — EKG
Date Performed: 01/29/2018       Time Performed: 12:15:39

 

PTAGE:      82 years

 

EKG:      SINUS TACHYCARDIA ABNORMAL RHYTHM ECG 

 

NO PREVIOUS TRACING            

 

DOCTOR:   Merrill Reilly  Interpretating Date/Time  01/31/2018 01:16:59

## 2018-03-12 ENCOUNTER — HOSPITAL ENCOUNTER (OUTPATIENT)
Dept: HOSPITAL 17 - PHRSP | Age: 83
End: 2018-03-12
Attending: INTERNAL MEDICINE
Payer: COMMERCIAL

## 2018-03-12 DIAGNOSIS — J44.9: Primary | ICD-10-CM

## 2018-03-12 DIAGNOSIS — R06.02: ICD-10-CM

## 2018-03-12 PROCEDURE — 94729 DIFFUSING CAPACITY: CPT

## 2018-03-12 PROCEDURE — 94060 EVALUATION OF WHEEZING: CPT

## 2018-03-12 PROCEDURE — 94726 PLETHYSMOGRAPHY LUNG VOLUMES: CPT

## 2018-03-12 PROCEDURE — 94618 PULMONARY STRESS TESTING: CPT

## 2018-03-20 NOTE — RSPPFT
DATE OF PROCEDURE:   3/12/18



COMMENTS:   



VOLUMES

DYNAMIC:    FVC mildly reduced; FEV1 severely reduced.

STATIC:    FRC and RV severely increased; TLC mildly increased.

FLOWS:    FEV1% and FEF 25-75 severely reduced.

DIFFUSION:    Severely reduced.

FLOW VOLUME

      LOOP:    Pattern of variable intrathoracic airways obstruction.  



IMPRESSION:    

   

Severe obstructive ventilatory defect with reduction in diffusion consistent with 
emphysema. Severe hyperinflation and very insignificant change post-bronchodilator.

## 2018-04-12 ENCOUNTER — HOSPITAL ENCOUNTER (OUTPATIENT)
Dept: HOSPITAL 17 - HRAD | Age: 83
Discharge: HOME | End: 2018-04-12
Attending: INTERNAL MEDICINE
Payer: COMMERCIAL

## 2018-04-12 VITALS
TEMPERATURE: 97.7 F | HEART RATE: 88 BPM | DIASTOLIC BLOOD PRESSURE: 74 MMHG | SYSTOLIC BLOOD PRESSURE: 140 MMHG | OXYGEN SATURATION: 96 % | RESPIRATION RATE: 20 BRPM

## 2018-04-12 VITALS
SYSTOLIC BLOOD PRESSURE: 154 MMHG | RESPIRATION RATE: 18 BRPM | TEMPERATURE: 97.6 F | HEART RATE: 90 BPM | OXYGEN SATURATION: 93 % | DIASTOLIC BLOOD PRESSURE: 87 MMHG

## 2018-04-12 VITALS
OXYGEN SATURATION: 97 % | RESPIRATION RATE: 20 BRPM | HEART RATE: 81 BPM | DIASTOLIC BLOOD PRESSURE: 70 MMHG | SYSTOLIC BLOOD PRESSURE: 120 MMHG

## 2018-04-12 VITALS
SYSTOLIC BLOOD PRESSURE: 130 MMHG | HEART RATE: 65 BPM | OXYGEN SATURATION: 97 % | RESPIRATION RATE: 18 BRPM | DIASTOLIC BLOOD PRESSURE: 73 MMHG

## 2018-04-12 VITALS — BODY MASS INDEX: 30.9 KG/M2 | WEIGHT: 174.39 LBS | HEIGHT: 63 IN

## 2018-04-12 VITALS
DIASTOLIC BLOOD PRESSURE: 71 MMHG | SYSTOLIC BLOOD PRESSURE: 124 MMHG | OXYGEN SATURATION: 96 % | RESPIRATION RATE: 18 BRPM | HEART RATE: 73 BPM

## 2018-04-12 VITALS
OXYGEN SATURATION: 97 % | HEART RATE: 78 BPM | RESPIRATION RATE: 20 BRPM | SYSTOLIC BLOOD PRESSURE: 118 MMHG | DIASTOLIC BLOOD PRESSURE: 49 MMHG

## 2018-04-12 VITALS
SYSTOLIC BLOOD PRESSURE: 131 MMHG | HEART RATE: 84 BPM | OXYGEN SATURATION: 97 % | RESPIRATION RATE: 20 BRPM | DIASTOLIC BLOOD PRESSURE: 62 MMHG

## 2018-04-12 VITALS
DIASTOLIC BLOOD PRESSURE: 70 MMHG | RESPIRATION RATE: 18 BRPM | OXYGEN SATURATION: 95 % | SYSTOLIC BLOOD PRESSURE: 135 MMHG | HEART RATE: 75 BPM

## 2018-04-12 DIAGNOSIS — Z85.3: ICD-10-CM

## 2018-04-12 DIAGNOSIS — C34.92: Primary | ICD-10-CM

## 2018-04-12 DIAGNOSIS — I87.2: ICD-10-CM

## 2018-04-12 DIAGNOSIS — Z87.891: ICD-10-CM

## 2018-04-12 PROCEDURE — 32405: CPT

## 2018-04-12 PROCEDURE — 88341 IMHCHEM/IMCYTCHM EA ADD ANTB: CPT

## 2018-04-12 PROCEDURE — 88305 TISSUE EXAM BY PATHOLOGIST: CPT

## 2018-04-12 PROCEDURE — 99152 MOD SED SAME PHYS/QHP 5/>YRS: CPT

## 2018-04-12 PROCEDURE — 99153 MOD SED SAME PHYS/QHP EA: CPT

## 2018-04-12 PROCEDURE — 71045 X-RAY EXAM CHEST 1 VIEW: CPT

## 2018-04-12 PROCEDURE — 88342 IMHCHEM/IMCYTCHM 1ST ANTB: CPT

## 2018-04-12 PROCEDURE — 77012 CT SCAN FOR NEEDLE BIOPSY: CPT

## 2018-04-12 NOTE — RADRPT
EXAM DATE/TIME:  04/12/2018 09:41 

 

[facility] COMPARISON:     

No previous studies available for comparison. However, correlated with outside chest CT and PET CT ex
am.

 

 

INDICATIONS :      

Left lung mass

                     

 

SEDATION TIME:       

30 minutes

 

 

BIOPSY SITE:       

Left lung

                     

 

MEDICATION(S):

1.) 2 mg midazolam (Versed) IV  

2.) 100 mcg fentanyl (Sublimaze) IV  

Vancomycin within 2 hrs of procedure, Ancef (or alternative) within 1 hr of procedure start.

 

 

DEVICE(S):

1.) 19 gauge  INTRODUCER

2.) 20 gauge Temno core biopsy needle 

                     

 

MEDICAL HISTORY :     

Carcinoma, breast.   

 

SURGICAL HISTORY :     

Appendectomy. Hysterectomy.  

 

ENCOUNTER:     

Initial

 

ACUITY:     

1 day

 

PAIN SCORE:     

0/10

 

LOCATION:     

Left chest

                     

A total of five core specimen(s) were obtained and sent to the laboratory for pathologic evaluation.

 

 

PROCEDURE:

1.   CT guided lung biopsy.

 

 

Prior to the procedure informed consent was obtained.  Any appropriate prior imaging studies were rev
iewed. Using automated exposure control and adjustment of the mA and/or kV according to patient size,
 radiation dose was kept as low as reasonably achievable to obtain optimal diagnostic quality images.
  DICOM format image data is available electronically for review and comparison.  

 

The site was prepped in a sterile fashion.  Full sterile technique was used, including cap, mask, simona
rile gloves and gown and a large sterile sheet.  Hand hygiene and 2% chlorhexidine and/or betadine/al
cohol prep was utilized per protocol for cutaneous antisepsis.  The skin and subcutaneous tissues wer
e infiltrated with local anesthetic solution.

 

With CT guidance the left upper lobe lung mass was localized. Biopsy was performed using the prescrib
ed needle as above.  Adequate hemostasis was obtained with compression at the puncture site.

 

Follow-up CT scan reveals no pneumothorax or significant acute complication.

 

Conscious sedation was performed with the prescribed dosages and duration as above in the presence of
 an independent trained radiology nurse to assist in the monitoring of the patient.  EKG and oximetry
 remained stable throughout the procedure. The patient tolerated the procedure well and there were no
 complications. The patient was sent to Radiology Outpatient Unit in stable condition.

 

CONCLUSION:     

Uncomplicated CT guided biopsy of the left upper lobe lung mass.

 

 

 

 Dakota Morton MD on April 12, 2018 at 10:30           

Board Certified Radiologist.

 This report was verified electronically.

## 2018-04-12 NOTE — RADRPT
EXAM DATE/TIME:  04/12/2018 11:56 

 

[facility] COMPARISON:     

CHEST EXPIRATION ONLY, April 12, 2018, 11:04.

 

                     

INDICATIONS :     

Post chest biopsy.

                     

 

MEDICAL HISTORY :            

Venous insufficiency. Carcinoma, breast. Left lung mass   

 

SURGICAL HISTORY :        

Appendectomy. Hysterectomy

 

ENCOUNTER:     

Subsequent                                        

 

ACUITY:     

1 day      

 

PAIN SCORE:     

0/10

 

LOCATION:     

Left  chest

 

FINDINGS:     

A single frontal expiratory view of the chest was performed.  The lungs are symmetrically aerated wit
h no pneumothorax. Nodular densities in the left upper and lower lung are unchanged. Right lung remai
ns clear. Heart size is normal. Osseous structures are intact

 

CONCLUSION:     

1. No pneumothorax.

2. Stable nodular densities in the left hemithorax.

 

 

 

 Zi Pemberton MD on April 12, 2018 at 12:58           

Board Certified Radiologist.

 This report was verified electronically.

## 2018-04-12 NOTE — PD.RAD
Post CT Procedure Prog Note


Pre Procedure Diagnosis:  


(1) Mass of upper lobe of left lung


Post Procedure Diagnosis:  


(1) Mass of upper lobe of left lung


Procedure Date:


Apr 12, 2018


Supervising Radiologist:


Dakota Morton


Estimated blood loss:


minimal


Anesthesia:  Conscious Sedation


Plan of Activity


Patient to Unit:  [facility]


Patient Condition:  Good


See PACS Report for procedural detail/treatment





Biopsy


Imaging Guidance:  CT


Side:  Left


Biopsy Procedure:  Lung


Level:


left upper lobe


Specimen:  Core Biopsy


Additional Detail:


5 20G cores obtained.


Plan


to [facility] for monitoring and cxr.











Dakota Morton MD Apr 12, 2018 10:07

## 2018-04-12 NOTE — RADRPT
EXAM DATE/TIME:  04/12/2018 11:04 

 

[facility] COMPARISON:     

No previous studies available for comparison.

 

                     

INDICATIONS :     

Evaluate for pneumothorax post thorcentesis

                     

 

MEDICAL HISTORY :     

Venous insufficiency.       Carcinoma, breast. Left lung mass   

 

SURGICAL HISTORY :        

Appendectomy. Hysterectomy

 

ENCOUNTER:     

Subsequent                                        

 

ACUITY:     

1 day      

 

PAIN SCORE:     

0/10

 

LOCATION:      

chest 

 

FINDINGS:     

Upright expiratory view of the chest demonstrates no pneumothorax following recent left lung mass bio
psy. There is parenchymal opacity in the left upper lobe representing the lung mass.

 

CONCLUSION:     

No pneumothorax following recent left lung biopsy.

 

 

 

 Dakota Morton MD on April 12, 2018 at 11:34           

Board Certified Radiologist.

 This report was verified electronically.

## 2018-06-05 ENCOUNTER — HOSPITAL ENCOUNTER (EMERGENCY)
Dept: HOSPITAL 17 - NEPE | Age: 83
Discharge: HOME | End: 2018-06-05
Payer: COMMERCIAL

## 2018-06-05 VITALS
HEART RATE: 104 BPM | SYSTOLIC BLOOD PRESSURE: 195 MMHG | OXYGEN SATURATION: 90 % | RESPIRATION RATE: 22 BRPM | DIASTOLIC BLOOD PRESSURE: 89 MMHG

## 2018-06-05 VITALS
SYSTOLIC BLOOD PRESSURE: 154 MMHG | DIASTOLIC BLOOD PRESSURE: 69 MMHG | RESPIRATION RATE: 20 BRPM | OXYGEN SATURATION: 98 % | HEART RATE: 75 BPM

## 2018-06-05 VITALS — HEART RATE: 87 BPM | SYSTOLIC BLOOD PRESSURE: 188 MMHG | DIASTOLIC BLOOD PRESSURE: 74 MMHG | OXYGEN SATURATION: 100 %

## 2018-06-05 VITALS — OXYGEN SATURATION: 99 %

## 2018-06-05 DIAGNOSIS — F41.9: ICD-10-CM

## 2018-06-05 DIAGNOSIS — J44.9: ICD-10-CM

## 2018-06-05 DIAGNOSIS — F32.9: ICD-10-CM

## 2018-06-05 DIAGNOSIS — C34.90: ICD-10-CM

## 2018-06-05 DIAGNOSIS — M19.90: ICD-10-CM

## 2018-06-05 DIAGNOSIS — I10: Primary | ICD-10-CM

## 2018-06-05 DIAGNOSIS — R60.0: ICD-10-CM

## 2018-06-05 PROCEDURE — 96374 THER/PROPH/DIAG INJ IV PUSH: CPT

## 2018-06-05 PROCEDURE — 94664 DEMO&/EVAL PT USE INHALER: CPT

## 2018-06-05 NOTE — PD
HPI


Chief Complaint:  Hypertension


Time Seen by Provider:  18:53


Travel History


International Travel<30 days:  No


Contact w/Intl Traveler<30days:  No


Traveled to known affect area:  No





History of Present Illness


HPI


The patient is a 82-year-old  female who presents to the emergency 

department from her radiation oncologist office for evaluation of elevated 

blood pressure.  The patient has a recent diagnosis of lung carcinoma and 

underwent mapping for radiation therapy earlier today.  The patient states her 

blood pressure was elevated in the 180s and 190, she states she was nervous, 

anxious, and frustrated with the mapping process.  The patient states her blood 

pressures normally normal at Dr. Estes's office.  Patient also states she has a 

follow-up appointment tomorrow with her primary physician Dr. Delaney.  The 

patient does have a history of COPD that is chronic and is on oxygen 2 L via 

nasal cannula.  She also states she does nebulizer throughout the day and is 

scheduled for nebulizer now.  She denies any acute headache, chest pain, 

shortness of breath, nausea, vomiting, or focal deficits.  She does have a 

history of mild bilateral lower extremity edema which is chronic.  Symptoms are 

mild.





PFSH


Past Medical History


Arthritis:  Yes


Anxiety:  Yes


Depression:  Yes


Cancer:  Yes (Left breast lumpectomy)


Cardiovascular Problems:  No


COPD:  Yes


Diabetes:  No


Endocrine:  No


Genitourinary:  Yes (Freq)


Hepatitis:  No


Immune Disorder:  No


Musculoskeletal:  Yes (arthritis)


Neurologic:  Yes


Psychiatric:  Yes (anxiety)


Reproductive:  No


Respiratory:  Yes (COPD, lung mass, Continuous O2 2 L NC)


Immunizations Current:  No


Thyroid Disease:  No





Past Surgical History


Abdominal Surgery:  No


AICD:  No


Appendectomy:  Yes


Cardiac Surgery:  No


Gynecologic Surgery:  Yes (total Hysterectomy)


Hysterectomy:  Yes


Joint Replacement:  No


Pacemaker:  No


Thoracic Surgery:  No


Other Surgery:  Yes (l breast lumpectomy)





Social History


Alcohol Use:  No


Tobacco Use:  No


Substance Use:  No





Allergies-Medications


(Allergen,Severity, Reaction):  


Coded Allergies:  


     Iodinated Contrast- Oral and IV Dye (Verified  Allergy, Severe, 18)


 35 years turned and almost .


Reported Meds & Prescriptions





Reported Meds & Active Scripts


Active


Nebulizer 1 Mis Mis Ea .XX AS DIRECTED


[Albuterol-Ipratropium Neb] 1 AMPULE Nebu 1 Ampule NEB Q6HR WHILE AWAKE NEB PRN


Prednisone 20 Mg Tab 20 Mg PO AS DIRECTED


     40 MG twice a day x 3 days, then 20 MG daily x 3 days, then 10 MG


     daily x 3 days


Walker with Front Wheels (Device) 1 Mis Mis Ea .XX AS DIRECTED


Oxygen (O2)  Device Liter ADRIAN.CANULA CONTINUOUS


     Oxygen Concentrator Portable Gaseous


     4 L/min via Nasal Canula Continuous


     For 99 months


Reported


Breo Ellipta Inh (Fluticasone/Vilanterol) 100-25 Mcg/Act Inh 1 Puff INH DAILY


     Use daily at the same time.


Citalopram (Citalopram Hydrobromide) 10 Mg Tab 10 Mg PO DAILY








Review of Systems


Except as stated in HPI:  all other systems reviewed are Neg


General / Constitutional:  No: Fever


HENT:  No: Headaches, Lightheadedness


Cardiovascular:  No: Chest Pain or Discomfort


Respiratory:  Positive: Wheezing (With history of COPD on oxygen chronically), 

No: Shortness of Breath


Gastrointestinal:  No: Nausea, Vomiting, Abdominal Pain


Musculoskeletal:  Positive: Edema


Neurologic:  No: Weakness, Dizziness





Physical Exam


Narrative


GENERAL: Awake, alert, pleasant 82-year-old female who appears her stated age 

and is in no acute respiratory distress.


SKIN: Focused skin assessment warm/dry.


HEAD: Atraumatic. Normocephalic. 


EYES: No injection or drainage.


ENT: No nasal bleeding or discharge.  Mucous membranes pink and moist.


NECK: Trachea midline. No JVD. 


CARDIOVASCULAR: Regular, tachycardic with a heart rate of 102.


RESPIRATORY: No accessory muscle use.  Prolonged expiratory phase with wheezing 

noted.


GASTROINTESTINAL: Abdomen soft, non-tender, nondistended. Hepatic and splenic 

margins not palpable. 


MUSCULOSKELETAL: No obvious deformities. No clubbing.  No cyanosis.  Bilateral 

lower extremity mild pitting edema.


NEUROLOGICAL: Awake and alert. No obvious cranial nerve deficits.  Motor 

grossly within normal limits. Normal speech.  Nonfocal.  Oriented 4.  Follows 

commands without difficulty.


PSYCHIATRIC: Appropriate mood and affect; insight and judgment normal.





Data


Data


Last Documented VS





Vital Signs








  Date Time  Temp Pulse Resp B/P (MAP) Pulse Ox O2 Delivery O2 Flow Rate FiO2


 


18 20:16  75 20 154/69 (97) 98 Nasal Cannula 2.00 








Orders





 Orders


Labetalol Inj (Trandate Inj) (18 19:15)


Albuterol-Ipratropium Neb (Duoneb Neb) (18 19:15)


Ed Discharge Order (18 21:27)








[facility]


Medical Decision Making


Medical Screen Exam Complete:  Yes


Emergency Medical Condition:  Yes


Medical Record Reviewed:  Yes


Differential Diagnosis


Differential diagnosis includes hypertension, hypertensive urgency, 

hypertensive emergency, stress reaction, adjustment reaction, anxiety.


Narrative Course


The patient states she normally has normal blood pressure at her physician's 

office, however, was anxious prior to the mapping process today.  She was 

advised to take an Ativan prior to going to the mapping sequence, however, did 

not take the Ativan.  She does state she was somewhat anxious but denies any 

headache, chest pain, shortness of breath, nausea, vomiting, or acute focal 

deficits.  After discussion was agreed we give the patient 1 dose of 

antihypertensive medication to evaluate if her blood pressure came down as well 

as her DuoNeb that is scheduled for this evening.  If her blood pressure 

responds appropriately she will be discharged home to follow-up with her 

primary physician, Dr. Delaney, tomorrow as scheduled.  She does state she has had 

blood work done recently does not want blood work performed today.  The patient'

s blood pressure improved with a systolic in 150s and a diastolic in the 70s.  

The patient was asymptomatic.  After discussion it was agreed the patient would 

be discharged home and follow-up with her primary physician tomorrow.  She is 

advised to return if symptoms worsen or progress.





Diagnosis





 Primary Impression:  


 Hypertension


 Qualified Codes:  I10 - Essential (primary) hypertension


Patient Instructions:  General Instructions





***Additional Instructions:  


Follow-up with your primary physician.  Return if symptoms worsen or progress.  

Follow-up with Dr. Delaney tomorrow as scheduled.


Disposition:  01 DISCHARGE HOME


Condition:  Stable











Tom Frias MD 2018 19:19

## 2018-06-21 ENCOUNTER — HOSPITAL ENCOUNTER (OUTPATIENT)
Dept: HOSPITAL 17 - HROP | Age: 83
Discharge: HOME | End: 2018-06-21
Attending: INTERNAL MEDICINE
Payer: COMMERCIAL

## 2018-06-21 VITALS
DIASTOLIC BLOOD PRESSURE: 69 MMHG | SYSTOLIC BLOOD PRESSURE: 144 MMHG | TEMPERATURE: 98.3 F | HEART RATE: 105 BPM | RESPIRATION RATE: 18 BRPM | OXYGEN SATURATION: 93 %

## 2018-06-21 VITALS
TEMPERATURE: 97.8 F | HEART RATE: 89 BPM | SYSTOLIC BLOOD PRESSURE: 184 MMHG | RESPIRATION RATE: 20 BRPM | DIASTOLIC BLOOD PRESSURE: 95 MMHG | OXYGEN SATURATION: 94 %

## 2018-06-21 VITALS
DIASTOLIC BLOOD PRESSURE: 74 MMHG | SYSTOLIC BLOOD PRESSURE: 144 MMHG | OXYGEN SATURATION: 97 % | HEART RATE: 93 BPM | RESPIRATION RATE: 20 BRPM

## 2018-06-21 VITALS — BODY MASS INDEX: 31.95 KG/M2 | HEIGHT: 63 IN | WEIGHT: 180.34 LBS

## 2018-06-21 VITALS
SYSTOLIC BLOOD PRESSURE: 127 MMHG | DIASTOLIC BLOOD PRESSURE: 43 MMHG | HEART RATE: 88 BPM | RESPIRATION RATE: 20 BRPM | OXYGEN SATURATION: 97 %

## 2018-06-21 VITALS
OXYGEN SATURATION: 96 % | SYSTOLIC BLOOD PRESSURE: 137 MMHG | RESPIRATION RATE: 20 BRPM | DIASTOLIC BLOOD PRESSURE: 75 MMHG | HEART RATE: 90 BPM

## 2018-06-21 DIAGNOSIS — Z45.2: Primary | ICD-10-CM

## 2018-06-21 DIAGNOSIS — J44.9: ICD-10-CM

## 2018-06-21 DIAGNOSIS — Z79.899: ICD-10-CM

## 2018-06-21 DIAGNOSIS — F41.9: ICD-10-CM

## 2018-06-21 DIAGNOSIS — C34.12: ICD-10-CM

## 2018-06-21 LAB
INR PPP: 1 RATIO
PROTHROMBIN TIME: 10.5 SEC (ref 9.8–11.6)

## 2018-06-21 PROCEDURE — 99152 MOD SED SAME PHYS/QHP 5/>YRS: CPT

## 2018-06-21 PROCEDURE — 76937 US GUIDE VASCULAR ACCESS: CPT

## 2018-06-21 PROCEDURE — 85610 PROTHROMBIN TIME: CPT

## 2018-06-21 PROCEDURE — 99153 MOD SED SAME PHYS/QHP EA: CPT

## 2018-06-21 PROCEDURE — C1788 PORT, INDWELLING, IMP: HCPCS

## 2018-06-21 PROCEDURE — 77001 FLUOROGUIDE FOR VEIN DEVICE: CPT

## 2018-06-21 PROCEDURE — 36561 INSERT TUNNELED CV CATH: CPT

## 2018-06-21 NOTE — RADRPT
EXAM DATE:  6/21/2018 9:34 AM EDT

AGE/SEX:        82 years / Female



INDICATIONS:  Patient presents with lung cancer in need of port placement for treatment.



CLINICAL DATA:  This is the patient's initial encounter. Patient reports that signs and symptoms have
 been present for 3 months and indicates a pain score of 1/10. 

                                                                          

MEDICAL/SURGICAL HISTORY:       Carcinoma, lung.  Chronic obstructive pulmonary disease. Anxiety  Lucy
endectomy.  Hysterectomy.  Tonsillectomy.  Lumpectomy



COMPARISON:      No prior exams available for comparison. 





FLUORO TIME (min):     .21

IMAGE SERIES:

SEDATION TIME (min):  30







MEDICATION(S):

3mg midazolam (Versed) IV

150mcg fentanyl (Sublimaze) IV













DEVICE(S):

Right 8F Power Port









 

. .



PROCEDURE :    

1.  Continuous pulse oximetry and EKG monitoring.

2.  Intravenous conscious sedation.

3.  Ultrasound guidance for venous access.

4.  Fluoroscopic guided implantable central venous port placement.



The patient was placed supine. The neck was prepped in sterile fashion.  Full sterile technique was u
sed, including cap, mask, sterile gloves and gown, and a large sterile sheet.  Hand hygiene and 2% ch
lorhexidine Betadine was utilized per protocol for cutaneous antisepsis with appropriate dry time for
 site.  Sterile gel and sterile probe cover were utilized for ultrasound guidance.   The skin and sub
cutaneous tissues were infiltrated with local anesthetic solution.  



Under direct ultrasound guidance, central venous access was accomplished in the targeted vessel.  The
 ultrasound images depicting access guidance were stored and saved to PACS for permanent record.  A s
ubcutaneous pocket was created using blunt dissection.  The port was introduced to the pocket.  The c
atheter tubing was fed through a subcutaneous tunnel to the venotomy site.  The catheter tubing was c
ut to a suitable length and then was introduced through a valved Peel-Away sheath and positioned with
 catheter tubing tip at the cavo-atrial junction level.  The pocket incision was closed with subcutic
ular Vicryl suture.  Steri-Strips were applied.  The port was flushed and locked with heparin solutio
n per protocol.  Sterile dressing was applied to the site.  The patient tolerated the procedure well.




Conscious sedation was performed with the prescribed dosages and duration as above in the presence of
 an independent trained radiology nurse to assist in the monitoring of the patient.  EKG and oximetry
 remained stable throughout the procedure. The patient tolerated the procedure well and there were no
 complications. The patient was sent to post anesthesia recovery in stable condition.



CONCLUSION:  

1.  Uncomplicated ultrasound and fluoroscopic guided implanted central venous port catheter placement
 as described in detail above.  An 8 Scottish Power port was placed.



Electronically signed by: Freddy Mann MD  6/21/2018 2:44 PM EDT

## 2018-06-21 NOTE — PD.RAD
Post Procedure Progress Note


Pre Procedure Diagnosis:  


(1) Mass of upper lobe of left lung


Post Procedure Diagnosis:  


(1) Mass of upper lobe of left lung


Procedure Date:


Jun 21, 2018


Supervising Radiologist:


Freddy Mann


Proceduralist/Assist:  RT Sharif(R), Other


Anesthesia:  Conscious Sedation


Plan of Activity


Patient to Unit:  [facility]


Patient Condition:  Good


See PACS Report for procedural detail/treatment











Freddy Mann MD Jun 21, 2018 09:22

## 2019-04-08 ENCOUNTER — APPOINTMENT (RX ONLY)
Dept: URBAN - METROPOLITAN AREA CLINIC 52 | Facility: CLINIC | Age: 84
Setting detail: DERMATOLOGY
End: 2019-04-08

## 2019-04-08 DIAGNOSIS — L57.8 OTHER SKIN CHANGES DUE TO CHRONIC EXPOSURE TO NONIONIZING RADIATION: ICD-10-CM

## 2019-04-08 DIAGNOSIS — D485 NEOPLASM OF UNCERTAIN BEHAVIOR OF SKIN: ICD-10-CM

## 2019-04-08 DIAGNOSIS — D69.2 OTHER NONTHROMBOCYTOPENIC PURPURA: ICD-10-CM

## 2019-04-08 PROBLEM — D48.5 NEOPLASM OF UNCERTAIN BEHAVIOR OF SKIN: Status: ACTIVE | Noted: 2019-04-08

## 2019-04-08 PROBLEM — Z85.118 PERSONAL HISTORY OF OTHER MALIGNANT NEOPLASM OF BRONCHUS AND LUNG: Status: ACTIVE | Noted: 2019-04-08

## 2019-04-08 PROBLEM — F41.9 ANXIETY DISORDER, UNSPECIFIED: Status: ACTIVE | Noted: 2019-04-08

## 2019-04-08 PROCEDURE — ? COUNSELING

## 2019-04-08 PROCEDURE — ? BIOPSY BY SHAVE METHOD

## 2019-04-08 PROCEDURE — 99213 OFFICE O/P EST LOW 20 MIN: CPT | Mod: 25

## 2019-04-08 PROCEDURE — 11102 TANGNTL BX SKIN SINGLE LES: CPT

## 2019-04-08 PROCEDURE — ? INVENTORY

## 2019-04-08 ASSESSMENT — LOCATION SIMPLE DESCRIPTION DERM
LOCATION SIMPLE: LEFT FOREARM
LOCATION SIMPLE: LEFT FOREARM
LOCATION SIMPLE: RIGHT FOREARM

## 2019-04-08 ASSESSMENT — LOCATION DETAILED DESCRIPTION DERM
LOCATION DETAILED: LEFT DISTAL DORSAL FOREARM
LOCATION DETAILED: RIGHT PROXIMAL DORSAL FOREARM
LOCATION DETAILED: LEFT PROXIMAL DORSAL FOREARM

## 2019-04-08 ASSESSMENT — LOCATION ZONE DERM
LOCATION ZONE: ARM
LOCATION ZONE: ARM

## 2019-04-08 NOTE — PROCEDURE: BIOPSY BY SHAVE METHOD
Consent: Written consent was obtained and risks were reviewed including but not limited to scarring, infection, bleeding, scabbing, incomplete removal, nerve damage and allergy to anesthesia.
Curettage Text: The wound bed was treated with curettage after the biopsy was performed.
Detail Level: Simple
Lab: -7
Anesthesia Type: 1% lidocaine with 1:100,000 epinephrine and a 1:3 solution of 8.4% sodium bicarbonate
Destruction After The Procedure: No
Additional Anesthesia Volume In Cc (Will Not Render If 0): 0
Cryotherapy Text: The wound bed was treated with cryotherapy after the biopsy was performed.
Wound Care: Petrolatum
Lab Facility: [id number]
Depth Of Biopsy: dermis
Electrodesiccation Text: The wound bed was treated with electrodesiccation after the biopsy was performed.
Type Of Destruction Used: Curettage
Anesthesia Volume In Cc (Will Not Render If 0): 0.5
Biopsy Type: H and E
Billing Type: Third-Party Bill
Post-Care Instructions: I reviewed with the patient in detail post-care instructions. Patient is to keep the biopsy site dry overnight, and then apply bacitracin twice daily until healed. Patient may apply hydrogen peroxide soaks to remove any crusting.
Electrodesiccation And Curettage Text: The wound bed was treated with electrodesiccation and curettage after the biopsy was performed.
Body Location Override (Optional - Billing Will Still Be Based On Selected Body Map Location If Applicable): left upper arm
Was A Bandage Applied: Yes
Silver Nitrate Text: The wound bed was treated with silver nitrate after the biopsy was performed.
Dressing: bandage
Biopsy Method: Personna blade
Hemostasis: Sparkle's
Notification Instructions: Patient will be notified of biopsy results. However, patient instructed to call the office if not contacted within 2 weeks.

## 2019-04-18 ENCOUNTER — APPOINTMENT (RX ONLY)
Dept: URBAN - METROPOLITAN AREA CLINIC 52 | Facility: CLINIC | Age: 84
Setting detail: DERMATOLOGY
End: 2019-04-18

## 2019-04-18 PROBLEM — Z85.828 PERSONAL HISTORY OF OTHER MALIGNANT NEOPLASM OF SKIN: Status: ACTIVE | Noted: 2019-04-18

## 2019-04-18 PROBLEM — C44.629 SQUAMOUS CELL CARCINOMA OF SKIN OF LEFT UPPER LIMB, INCLUDING SHOULDER: Status: ACTIVE | Noted: 2019-04-18

## 2019-04-18 PROCEDURE — 96405 CHEMO INTRALESIONAL UP TO 7: CPT

## 2019-04-18 PROCEDURE — ? COUNSELING

## 2019-04-18 PROCEDURE — ? INTRALESIONAL CHEMOTHERAPY INJECTION

## 2019-05-06 ENCOUNTER — APPOINTMENT (RX ONLY)
Dept: URBAN - METROPOLITAN AREA CLINIC 52 | Facility: CLINIC | Age: 84
Setting detail: DERMATOLOGY
End: 2019-05-06

## 2019-05-06 DIAGNOSIS — D485 NEOPLASM OF UNCERTAIN BEHAVIOR OF SKIN: ICD-10-CM

## 2019-05-06 DIAGNOSIS — L57.8 OTHER SKIN CHANGES DUE TO CHRONIC EXPOSURE TO NONIONIZING RADIATION: ICD-10-CM

## 2019-05-06 PROBLEM — D48.5 NEOPLASM OF UNCERTAIN BEHAVIOR OF SKIN: Status: ACTIVE | Noted: 2019-05-06

## 2019-05-06 PROBLEM — C44.629 SQUAMOUS CELL CARCINOMA OF SKIN OF LEFT UPPER LIMB, INCLUDING SHOULDER: Status: ACTIVE | Noted: 2019-05-06

## 2019-05-06 PROCEDURE — ? INTRALESIONAL CHEMOTHERAPY INJECTION

## 2019-05-06 PROCEDURE — ? BIOPSY BY SHAVE METHOD

## 2019-05-06 PROCEDURE — ? SUNSCREEN RECOMMENDATIONS

## 2019-05-06 PROCEDURE — 96405 CHEMO INTRALESIONAL UP TO 7: CPT

## 2019-05-06 PROCEDURE — 99213 OFFICE O/P EST LOW 20 MIN: CPT | Mod: 25

## 2019-05-06 PROCEDURE — 11102 TANGNTL BX SKIN SINGLE LES: CPT

## 2019-05-06 ASSESSMENT — LOCATION ZONE DERM: LOCATION ZONE: NOSE

## 2019-05-06 ASSESSMENT — LOCATION SIMPLE DESCRIPTION DERM: LOCATION SIMPLE: NOSE

## 2019-05-06 ASSESSMENT — LOCATION DETAILED DESCRIPTION DERM: LOCATION DETAILED: NASAL SUPRATIP

## 2019-05-06 NOTE — PROCEDURE: BIOPSY BY SHAVE METHOD
Notification Instructions: Patient will be notified of biopsy results. However, patient instructed to call the office if not contacted within 2 weeks.
Was A Bandage Applied: Yes
Biopsy Method: Personna blade
Hemostasis: Sparkle's
Bill For Surgical Tray: no
Dressing: bandage
Silver Nitrate Text: The wound bed was treated with silver nitrate after the biopsy was performed.
Body Location Override (Optional - Billing Will Still Be Based On Selected Body Map Location If Applicable): tip of nose
Cryotherapy Text: The wound bed was treated with cryotherapy after the biopsy was performed.
Detail Level: Detailed
Lab: -7
Curettage Text: The wound bed was treated with curettage after the biopsy was performed.
Consent: Written consent was obtained and risks were reviewed including but not limited to scarring, infection, bleeding, scabbing, incomplete removal, nerve damage and allergy to anesthesia.
Electrodesiccation Text: The wound bed was treated with electrodesiccation after the biopsy was performed.
Depth Of Biopsy: dermis
Size Of Lesion In Cm: 0.5
Lab Facility: [id number]
Anesthesia Type: 1% lidocaine with epinephrine
Additional Anesthesia Volume In Cc (Will Not Render If 0): 0
Wound Care: Petrolatum
Electrodesiccation And Curettage Text: The wound bed was treated with electrodesiccation and curettage after the biopsy was performed.
Post-Care Instructions: I reviewed with the patient in detail post-care instructions. Patient to keep bandaid on for 24 hours. Then remove, wash with soap and water and apply vaseline twice daily until healed.
Billing Type: Third-Party Bill
Biopsy Type: H and E
Type Of Destruction Used: Curettage

## 2019-06-04 ENCOUNTER — APPOINTMENT (RX ONLY)
Dept: URBAN - METROPOLITAN AREA CLINIC 52 | Facility: CLINIC | Age: 84
Setting detail: DERMATOLOGY
End: 2019-06-04

## 2019-06-04 PROBLEM — C44.629 SQUAMOUS CELL CARCINOMA OF SKIN OF LEFT UPPER LIMB, INCLUDING SHOULDER: Status: ACTIVE | Noted: 2019-06-04

## 2019-06-04 PROBLEM — C44.311 BASAL CELL CARCINOMA OF SKIN OF NOSE: Status: ACTIVE | Noted: 2019-06-04

## 2019-06-04 PROCEDURE — ? CRYOSURGICAL DESTRUCTION

## 2019-06-04 PROCEDURE — ? CURETTAGE AND DESTRUCTION

## 2019-06-04 PROCEDURE — ? PRESCRIPTION

## 2019-06-04 PROCEDURE — 17280 DSTR MAL LS F/E/E/N/L/M .5/<: CPT

## 2019-06-04 PROCEDURE — 17261 DSTRJ MAL LES T/A/L .6-1.0CM: CPT

## 2019-06-04 RX ORDER — DOXYCYCLINE HYCLATE 100 MG/1
CAPSULE, GELATIN COATED ORAL BID
Qty: 14 | Refills: 0 | Status: ERX | COMMUNITY
Start: 2019-06-04

## 2019-06-04 RX ADMIN — DOXYCYCLINE HYCLATE 1: 100 CAPSULE, GELATIN COATED ORAL at 00:00

## 2019-06-04 NOTE — PROCEDURE: CURETTAGE AND DESTRUCTION
Additional Information: (Optional): The wound was cleaned, and a pressure dressing was applied.  The patient received detailed post-op instructions.
Cautery Type: electrodesiccation
Post-Care Instructions: I reviewed with the patient in detail post-care instructions. Patient is to keep the original bandage for 48 hours, and not to engage in any swimming until the area is healed. Should the patient develop any fevers, chills, bleeding, severe pain patient will contact the office immediately. Patient to wash area with soap and water, use vaseline to area two times daily till healed and keep covered with a bandage. Healing can take up to three months.
Bill As A Line Item Or As Units: Line Item
Number Of Curettages: 3
Render Post-Care Instructions In Note?: no
Size Of Lesion In Cm: 0.7
Anesthesia Type: 1% lidocaine with epinephrine
Total Volume (Ccs): 1
Anesthesia Volume In Cc: 5
Detail Level: Detailed
What Was Performed First?: Curettage
Consent was obtained from the patient. The risks, benefits and alternatives to therapy were discussed in detail. Specifically, the risks of infection, scarring, bleeding, prolonged wound healing, nerve injury, incomplete removal, allergy to anesthesia and recurrence were addressed. Alternatives to ED&C, such as: surgical removal and XRT were also discussed.  Prior to the procedure, the treatment site was clearly identified and confirmed by the patient. All components of Universal Protocol/PAUSE Rule completed.
Size Of Lesion After Curettage: 0
Body Location Override (Optional - Billing Will Still Be Based On Selected Body Map Location If Applicable): tip of nose

## 2019-06-04 NOTE — PROCEDURE: CRYOSURGICAL DESTRUCTION
Post-Care Instructions: I reviewed with the patient in detail post-care instructions. Patient is to keep the area dry for 48 hours, and not to engage in any heavy lifting, exercise, or swimming for the next 14 days. Should the patient develop any fevers, chills, bleeding, severe pain patient will contact the office immediately.
Anesthesia Type: 1% lidocaine with epinephrine
Additional Information: (Optional): The wound was cleaned, and a dressing was applied.  The patient received detailed post-op instructions.
Body Location Override (Optional - Billing Will Still Be Based On Selected Body Map Location If Applicable): left elbow
Anesthesia Volume In Cc: 0
Consent was obtained from the patient. The risks and benefits to therapy were discussed in detail. Specifically, the risks of infection, scarring, bleeding, prolonged wound healing, incomplete removal, allergy to anesthesia, nerve injury and recurrence were addressed. Alternatives to liquid nitrogen, such as ED&C, surgical removal, XRT were also discussed.  Prior to the procedure, the treatment site was clearly identified and confirmed by the patient. All components of Universal Protocol/PAUSE Rule completed.
Total Volume (Ccs): 1
Add Intralesional Injection: No
Bill As A Line Item Or As Units: Line Item
Medication Injected: 5-Fluorouracil
Pre-Procedure: The surgical site was antiseptically prepared.
Number Of Freeze-Thaw Cycles: 3 freeze-thaw cycles
Detail Level: Detailed
Render Post-Care In The Note: Yes
Size Of Lesion In Cm: 0.8
Total Time In Minutes: 3 minutes

## 2019-06-04 NOTE — PROCEDURE: MIPS QUALITY
Quality 431: Preventive Care And Screening: Unhealthy Alcohol Use - Screening: Patient screened for unhealthy alcohol use using a single question and scores less than 2 times per year
Quality 265: Biopsy Follow-Up: Biopsy results reviewed, communicated, tracked, and documented
Quality 130: Documentation Of Current Medications In The Medical Record: Current Medications Documented
Detail Level: Detailed
Quality 111:Pneumonia Vaccination Status For Older Adults: Pneumococcal Vaccination Previously Received

## 2019-06-07 ENCOUNTER — RX ONLY (OUTPATIENT)
Age: 84
Setting detail: RX ONLY
End: 2019-06-07

## 2019-06-07 RX ORDER — DOXYCYCLINE HYCLATE 100 MG/1
CAPSULE, GELATIN COATED ORAL
Qty: 14 | Refills: 0 | Status: ERX

## 2019-07-08 ENCOUNTER — APPOINTMENT (RX ONLY)
Dept: URBAN - METROPOLITAN AREA CLINIC 52 | Facility: CLINIC | Age: 84
Setting detail: DERMATOLOGY
End: 2019-07-08

## 2019-07-08 DIAGNOSIS — L57.8 OTHER SKIN CHANGES DUE TO CHRONIC EXPOSURE TO NONIONIZING RADIATION: ICD-10-CM

## 2019-07-08 DIAGNOSIS — D22 MELANOCYTIC NEVI: ICD-10-CM

## 2019-07-08 DIAGNOSIS — D69.2 OTHER NONTHROMBOCYTOPENIC PURPURA: ICD-10-CM

## 2019-07-08 DIAGNOSIS — L82.1 OTHER SEBORRHEIC KERATOSIS: ICD-10-CM

## 2019-07-08 DIAGNOSIS — L81.4 OTHER MELANIN HYPERPIGMENTATION: ICD-10-CM

## 2019-07-08 DIAGNOSIS — D18.0 HEMANGIOMA: ICD-10-CM

## 2019-07-08 DIAGNOSIS — Z85.828 PERSONAL HISTORY OF OTHER MALIGNANT NEOPLASM OF SKIN: ICD-10-CM

## 2019-07-08 DIAGNOSIS — L85.3 XEROSIS CUTIS: ICD-10-CM

## 2019-07-08 PROBLEM — C44.629 SQUAMOUS CELL CARCINOMA OF SKIN OF LEFT UPPER LIMB, INCLUDING SHOULDER: Status: ACTIVE | Noted: 2019-07-08

## 2019-07-08 PROBLEM — C44.311 BASAL CELL CARCINOMA OF SKIN OF NOSE: Status: ACTIVE | Noted: 2019-07-08

## 2019-07-08 PROBLEM — D22.9 MELANOCYTIC NEVI, UNSPECIFIED: Status: ACTIVE | Noted: 2019-07-08

## 2019-07-08 PROBLEM — D18.01 HEMANGIOMA OF SKIN AND SUBCUTANEOUS TISSUE: Status: ACTIVE | Noted: 2019-07-08

## 2019-07-08 PROCEDURE — ? COUNSELING

## 2019-07-08 PROCEDURE — 99214 OFFICE O/P EST MOD 30 MIN: CPT

## 2019-07-08 PROCEDURE — ? INVENTORY

## 2019-07-08 ASSESSMENT — LOCATION DETAILED DESCRIPTION DERM
LOCATION DETAILED: RIGHT MID-UPPER BACK
LOCATION DETAILED: RIGHT DISTAL PRETIBIAL REGION

## 2019-07-08 ASSESSMENT — LOCATION SIMPLE DESCRIPTION DERM
LOCATION SIMPLE: RIGHT PRETIBIAL REGION
LOCATION SIMPLE: RIGHT UPPER BACK

## 2019-07-08 ASSESSMENT — LOCATION ZONE DERM
LOCATION ZONE: LEG
LOCATION ZONE: TRUNK

## 2019-10-07 ENCOUNTER — APPOINTMENT (RX ONLY)
Dept: URBAN - METROPOLITAN AREA CLINIC 52 | Facility: CLINIC | Age: 84
Setting detail: DERMATOLOGY
End: 2019-10-07

## 2019-10-07 DIAGNOSIS — L81.4 OTHER MELANIN HYPERPIGMENTATION: ICD-10-CM

## 2019-10-07 DIAGNOSIS — D18.0 HEMANGIOMA: ICD-10-CM

## 2019-10-07 DIAGNOSIS — L85.3 XEROSIS CUTIS: ICD-10-CM

## 2019-10-07 DIAGNOSIS — D69.2 OTHER NONTHROMBOCYTOPENIC PURPURA: ICD-10-CM

## 2019-10-07 DIAGNOSIS — D22 MELANOCYTIC NEVI: ICD-10-CM

## 2019-10-07 DIAGNOSIS — L57.8 OTHER SKIN CHANGES DUE TO CHRONIC EXPOSURE TO NONIONIZING RADIATION: ICD-10-CM

## 2019-10-07 DIAGNOSIS — Z85.828 PERSONAL HISTORY OF OTHER MALIGNANT NEOPLASM OF SKIN: ICD-10-CM

## 2019-10-07 DIAGNOSIS — L82.1 OTHER SEBORRHEIC KERATOSIS: ICD-10-CM

## 2019-10-07 DIAGNOSIS — L57.0 ACTINIC KERATOSIS: ICD-10-CM

## 2019-10-07 PROBLEM — D18.01 HEMANGIOMA OF SKIN AND SUBCUTANEOUS TISSUE: Status: ACTIVE | Noted: 2019-10-07

## 2019-10-07 PROBLEM — D22.9 MELANOCYTIC NEVI, UNSPECIFIED: Status: ACTIVE | Noted: 2019-10-07

## 2019-10-07 PROCEDURE — 17000 DESTRUCT PREMALG LESION: CPT

## 2019-10-07 PROCEDURE — ? LIQUID NITROGEN

## 2019-10-07 PROCEDURE — ? INVENTORY

## 2019-10-07 PROCEDURE — ? COUNSELING

## 2019-10-07 PROCEDURE — 99213 OFFICE O/P EST LOW 20 MIN: CPT | Mod: 25

## 2019-10-07 ASSESSMENT — LOCATION ZONE DERM
LOCATION ZONE: NOSE
LOCATION ZONE: TRUNK
LOCATION ZONE: ARM
LOCATION ZONE: LEG

## 2019-10-07 ASSESSMENT — LOCATION SIMPLE DESCRIPTION DERM
LOCATION SIMPLE: NOSE
LOCATION SIMPLE: LEFT POSTERIOR UPPER ARM
LOCATION SIMPLE: RIGHT PRETIBIAL REGION
LOCATION SIMPLE: RIGHT FOREARM
LOCATION SIMPLE: RIGHT UPPER BACK

## 2019-10-07 ASSESSMENT — LOCATION DETAILED DESCRIPTION DERM
LOCATION DETAILED: RIGHT PROXIMAL PRETIBIAL REGION
LOCATION DETAILED: LEFT DISTAL POSTERIOR UPPER ARM
LOCATION DETAILED: RIGHT PROXIMAL DORSAL FOREARM
LOCATION DETAILED: NASAL DORSUM
LOCATION DETAILED: RIGHT MID-UPPER BACK

## 2019-10-07 NOTE — PROCEDURE: LIQUID NITROGEN
Render Post-Care Instructions In Note?: no
Detail Level: Detailed
Consent: The patient's consent was obtained including but not limited to risks of crusting, scabbing, blistering, scarring, darker or lighter pigmentary change, recurrence, incomplete removal and infection.
Number Of Freeze-Thaw Cycles: 3 freeze-thaw cycles
Duration Of Freeze Thaw-Cycle (Seconds): 3
Post-Care Instructions: I reviewed with the patient in detail post-care instructions. Patient is to wear sunprotection, and avoid picking at any of the treated lesions. Pt may apply Vaseline to crusted or scabbing areas.

## 2020-05-18 ENCOUNTER — APPOINTMENT (RX ONLY)
Dept: URBAN - METROPOLITAN AREA CLINIC 52 | Facility: CLINIC | Age: 85
Setting detail: DERMATOLOGY
End: 2020-05-18

## 2020-05-18 DIAGNOSIS — D18.0 HEMANGIOMA: ICD-10-CM

## 2020-05-18 DIAGNOSIS — Z85.828 PERSONAL HISTORY OF OTHER MALIGNANT NEOPLASM OF SKIN: ICD-10-CM

## 2020-05-18 DIAGNOSIS — L82.0 INFLAMED SEBORRHEIC KERATOSIS: ICD-10-CM

## 2020-05-18 DIAGNOSIS — D69.2 OTHER NONTHROMBOCYTOPENIC PURPURA: ICD-10-CM

## 2020-05-18 DIAGNOSIS — L81.4 OTHER MELANIN HYPERPIGMENTATION: ICD-10-CM

## 2020-05-18 DIAGNOSIS — L82.1 OTHER SEBORRHEIC KERATOSIS: ICD-10-CM

## 2020-05-18 PROBLEM — D18.01 HEMANGIOMA OF SKIN AND SUBCUTANEOUS TISSUE: Status: ACTIVE | Noted: 2020-05-18

## 2020-05-18 PROCEDURE — ? ADDITIONAL NOTES

## 2020-05-18 PROCEDURE — 17110 DESTRUCTION B9 LES UP TO 14: CPT

## 2020-05-18 PROCEDURE — ? LIQUID NITROGEN

## 2020-05-18 PROCEDURE — ? SUNSCREEN RECOMMENDATIONS

## 2020-05-18 PROCEDURE — ? COUNSELING

## 2020-05-18 PROCEDURE — 99213 OFFICE O/P EST LOW 20 MIN: CPT | Mod: 25

## 2020-05-18 ASSESSMENT — LOCATION DETAILED DESCRIPTION DERM
LOCATION DETAILED: LEFT LATERAL PROXIMAL PRETIBIAL REGION
LOCATION DETAILED: RIGHT DISTAL POSTERIOR UPPER ARM
LOCATION DETAILED: LEFT ANTERIOR DISTAL THIGH
LOCATION DETAILED: LEFT ANTECUBITAL SKIN
LOCATION DETAILED: LEFT DISTAL DORSAL FOREARM

## 2020-05-18 ASSESSMENT — LOCATION ZONE DERM
LOCATION ZONE: ARM
LOCATION ZONE: LEG

## 2020-05-18 ASSESSMENT — LOCATION SIMPLE DESCRIPTION DERM
LOCATION SIMPLE: LEFT FOREARM
LOCATION SIMPLE: LEFT THIGH
LOCATION SIMPLE: LEFT UPPER ARM
LOCATION SIMPLE: LEFT PRETIBIAL REGION
LOCATION SIMPLE: RIGHT POSTERIOR UPPER ARM

## 2020-05-18 NOTE — PROCEDURE: LIQUID NITROGEN
Add 52 Modifier (Optional): no
Include Z78.9 (Other Specified Conditions Influencing Health Status) As An Associated Diagnosis?: Yes
Detail Level: Detailed
Consent: The patient's consent was obtained including but not limited to risks of crusting, scabbing, blistering, scarring, darker or lighter pigmentary change, recurrence, incomplete removal and infection.
Medical Necessity Information: It is in your best interest to select a reason for this procedure from the list below. All of these items fulfill various CMS LCD requirements except the new and changing color options.
Post-Care Instructions: I reviewed with the patient in detail post-care instructions. Patient is to wear sunprotection, and avoid picking at any of the treated lesions. Pt may apply Vaseline to crusted or scabbing areas.
Medical Necessity Clause: This procedure was medically necessary because the lesions that were treated were:inflamed

## 2020-06-18 ENCOUNTER — APPOINTMENT (RX ONLY)
Dept: URBAN - METROPOLITAN AREA CLINIC 52 | Facility: CLINIC | Age: 85
Setting detail: DERMATOLOGY
End: 2020-06-18

## 2020-06-18 DIAGNOSIS — L82.0 INFLAMED SEBORRHEIC KERATOSIS: ICD-10-CM

## 2020-06-18 PROCEDURE — ? LIQUID NITROGEN (CM)

## 2020-06-18 PROCEDURE — 99212 OFFICE O/P EST SF 10 MIN: CPT | Mod: 25

## 2020-06-18 PROCEDURE — ? COUNSELING

## 2020-06-18 PROCEDURE — ? ADDITIONAL NOTES

## 2020-06-18 PROCEDURE — 17110 DESTRUCTION B9 LES UP TO 14: CPT

## 2020-06-18 ASSESSMENT — LOCATION DETAILED DESCRIPTION DERM
LOCATION DETAILED: LEFT ANTERIOR DISTAL UPPER ARM
LOCATION DETAILED: LEFT ANTERIOR PROXIMAL THIGH

## 2020-06-18 ASSESSMENT — LOCATION SIMPLE DESCRIPTION DERM
LOCATION SIMPLE: LEFT UPPER ARM
LOCATION SIMPLE: LEFT THIGH

## 2020-06-18 ASSESSMENT — LOCATION ZONE DERM
LOCATION ZONE: LEG
LOCATION ZONE: ARM

## 2020-06-18 NOTE — PROCEDURE: LIQUID NITROGEN
Medical Necessity Information: It is in your best interest to select a reason for this procedure from the list below. All of these items fulfill various CMS LCD requirements except the new and changing color options.
Medical Necessity Clause: This procedure was medically necessary because the lesions that were treated were:
Detail Level: Detailed
Consent: The patient's consent was obtained including but not limited to risks of crusting, scabbing, blistering, scarring, darker or lighter pigmentary change, recurrence, incomplete removal and infection.
Render Post-Care Instructions In Note?: no
Post-Care Instructions: I reviewed with the patient in detail post-care instructions. Patient is to wear sunprotection, and avoid picking at any of the treated lesions. Pt may apply Vaseline to crusted or scabbing areas.
Include Z78.9 (Other Specified Conditions Influencing Health Status) As An Associated Diagnosis?: Yes
Size Of Lesion In Cm (Optional): 0

## 2021-02-03 ENCOUNTER — APPOINTMENT (RX ONLY)
Dept: URBAN - METROPOLITAN AREA CLINIC 52 | Facility: CLINIC | Age: 86
Setting detail: DERMATOLOGY
End: 2021-02-03

## 2021-02-03 VITALS — TEMPERATURE: 97.2 F

## 2021-02-03 DIAGNOSIS — L82.1 OTHER SEBORRHEIC KERATOSIS: ICD-10-CM | Status: WELL CONTROLLED

## 2021-02-03 DIAGNOSIS — D485 NEOPLASM OF UNCERTAIN BEHAVIOR OF SKIN: ICD-10-CM

## 2021-02-03 DIAGNOSIS — L57.8 OTHER SKIN CHANGES DUE TO CHRONIC EXPOSURE TO NONIONIZING RADIATION: ICD-10-CM | Status: WELL CONTROLLED

## 2021-02-03 DIAGNOSIS — D18.0 HEMANGIOMA: ICD-10-CM | Status: WELL CONTROLLED

## 2021-02-03 PROBLEM — D48.5 NEOPLASM OF UNCERTAIN BEHAVIOR OF SKIN: Status: ACTIVE | Noted: 2021-02-03

## 2021-02-03 PROBLEM — D18.01 HEMANGIOMA OF SKIN AND SUBCUTANEOUS TISSUE: Status: ACTIVE | Noted: 2021-02-03

## 2021-02-03 PROCEDURE — ? BIOPSY BY SHAVE METHOD

## 2021-02-03 PROCEDURE — 11102 TANGNTL BX SKIN SINGLE LES: CPT

## 2021-02-03 PROCEDURE — 99213 OFFICE O/P EST LOW 20 MIN: CPT | Mod: 25

## 2021-02-03 PROCEDURE — ? COUNSELING

## 2021-02-03 PROCEDURE — ? ADDITIONAL NOTES

## 2021-02-03 ASSESSMENT — LOCATION ZONE DERM
LOCATION ZONE: NOSE
LOCATION ZONE: TRUNK

## 2021-02-03 ASSESSMENT — LOCATION DETAILED DESCRIPTION DERM
LOCATION DETAILED: RIGHT MID-UPPER BACK
LOCATION DETAILED: NASAL DORSUM
LOCATION DETAILED: MIDDLE STERNUM
LOCATION DETAILED: EPIGASTRIC SKIN

## 2021-02-03 ASSESSMENT — LOCATION SIMPLE DESCRIPTION DERM
LOCATION SIMPLE: RIGHT UPPER BACK
LOCATION SIMPLE: NOSE
LOCATION SIMPLE: CHEST
LOCATION SIMPLE: ABDOMEN